# Patient Record
Sex: MALE | Race: BLACK OR AFRICAN AMERICAN | NOT HISPANIC OR LATINO | URBAN - METROPOLITAN AREA
[De-identification: names, ages, dates, MRNs, and addresses within clinical notes are randomized per-mention and may not be internally consistent; named-entity substitution may affect disease eponyms.]

---

## 2018-07-26 ENCOUNTER — OUTPATIENT (OUTPATIENT)
Dept: OUTPATIENT SERVICES | Facility: HOSPITAL | Age: 68
LOS: 1 days | End: 2018-07-26

## 2018-07-26 VITALS
DIASTOLIC BLOOD PRESSURE: 78 MMHG | WEIGHT: 207.9 LBS | TEMPERATURE: 98 F | HEART RATE: 64 BPM | HEIGHT: 69 IN | RESPIRATION RATE: 16 BRPM | SYSTOLIC BLOOD PRESSURE: 128 MMHG

## 2018-07-26 DIAGNOSIS — Z01.818 ENCOUNTER FOR OTHER PREPROCEDURAL EXAMINATION: ICD-10-CM

## 2018-07-26 DIAGNOSIS — Z95.0 PRESENCE OF CARDIAC PACEMAKER: Chronic | ICD-10-CM

## 2018-07-26 DIAGNOSIS — Z98.89 OTHER SPECIFIED POSTPROCEDURAL STATES: Chronic | ICD-10-CM

## 2018-07-26 DIAGNOSIS — C90.00 MULTIPLE MYELOMA NOT HAVING ACHIEVED REMISSION: ICD-10-CM

## 2018-07-26 DIAGNOSIS — C90.00 MULTIPLE MYELOMA NOT HAVING ACHIEVED REMISSION: Chronic | ICD-10-CM

## 2018-07-26 DIAGNOSIS — Z95.0 PRESENCE OF CARDIAC PACEMAKER: ICD-10-CM

## 2018-07-26 DIAGNOSIS — T82.591A OTHER MECHANICAL COMPLICATION OF SURGICALLY CREATED ARTERIOVENOUS SHUNT, INITIAL ENCOUNTER: ICD-10-CM

## 2018-07-26 DIAGNOSIS — G47.33 OBSTRUCTIVE SLEEP APNEA (ADULT) (PEDIATRIC): ICD-10-CM

## 2018-07-26 LAB
APTT BLD: 37.8 SEC — HIGH (ref 27.5–37.4)
BUN SERPL-MCNC: 14 MG/DL — SIGNIFICANT CHANGE UP (ref 7–23)
CALCIUM SERPL-MCNC: 10 MG/DL — SIGNIFICANT CHANGE UP (ref 8.4–10.5)
CHLORIDE SERPL-SCNC: 103 MMOL/L — SIGNIFICANT CHANGE UP (ref 98–107)
CO2 SERPL-SCNC: 25 MMOL/L — SIGNIFICANT CHANGE UP (ref 22–31)
CREAT SERPL-MCNC: 0.87 MG/DL — SIGNIFICANT CHANGE UP (ref 0.5–1.3)
GLUCOSE SERPL-MCNC: 84 MG/DL — SIGNIFICANT CHANGE UP (ref 70–99)
HCT VFR BLD CALC: 41.5 % — SIGNIFICANT CHANGE UP (ref 39–50)
HGB BLD-MCNC: 13.7 G/DL — SIGNIFICANT CHANGE UP (ref 13–17)
INR BLD: 1.02 — SIGNIFICANT CHANGE UP (ref 0.88–1.17)
MCHC RBC-ENTMCNC: 31.7 PG — SIGNIFICANT CHANGE UP (ref 27–34)
MCHC RBC-ENTMCNC: 33 % — SIGNIFICANT CHANGE UP (ref 32–36)
MCV RBC AUTO: 96.1 FL — SIGNIFICANT CHANGE UP (ref 80–100)
NRBC # FLD: 0 — SIGNIFICANT CHANGE UP
PLATELET # BLD AUTO: 213 K/UL — SIGNIFICANT CHANGE UP (ref 150–400)
PMV BLD: 10.8 FL — SIGNIFICANT CHANGE UP (ref 7–13)
POTASSIUM SERPL-MCNC: 4.2 MMOL/L — SIGNIFICANT CHANGE UP (ref 3.5–5.3)
POTASSIUM SERPL-SCNC: 4.2 MMOL/L — SIGNIFICANT CHANGE UP (ref 3.5–5.3)
PROTHROM AB SERPL-ACNC: 11.7 SEC — SIGNIFICANT CHANGE UP (ref 9.8–13.1)
RBC # BLD: 4.32 M/UL — SIGNIFICANT CHANGE UP (ref 4.2–5.8)
RBC # FLD: 14.3 % — SIGNIFICANT CHANGE UP (ref 10.3–14.5)
SODIUM SERPL-SCNC: 141 MMOL/L — SIGNIFICANT CHANGE UP (ref 135–145)
WBC # BLD: 6.16 K/UL — SIGNIFICANT CHANGE UP (ref 3.8–10.5)
WBC # FLD AUTO: 6.16 K/UL — SIGNIFICANT CHANGE UP (ref 3.8–10.5)

## 2018-07-26 NOTE — H&P PST ADULT - LYMPHATIC
posterior cervical L/anterior cervical R/supraclavicular L/anterior cervical L/posterior cervical R/supraclavicular R

## 2018-07-26 NOTE — H&P PST ADULT - PROBLEM SELECTOR PLAN 1
to have removal of infusa port on 8/2/18. Scheduled to have removal of infusa port on 8/2/18.   labs pending, EKG to be done by Cardiologist .  Preop instructions provided to pt.   Chlorhexidine scrub and famotidine provided to pt with instructions

## 2018-07-26 NOTE — H&P PST ADULT - PROBLEM SELECTOR PLAN 3
Pt had pacemaker in place right CW.   Pt advised to obtain cardiac clearance prior to surgery.   Pt was on coumadin, and d/charan by oncologist on 7/24/18. Pt/ptt done. As per pt - coumadin was to prevent clots in his chemo port

## 2018-07-26 NOTE — H&P PST ADULT - NEGATIVE GENERAL GENITOURINARY SYMPTOMS
no flank pain R/no incontinence/no flank pain L/no renal colic/no hematuria/normal urinary frequency/no bladder infections/no dysuria

## 2018-07-26 NOTE — H&P PST ADULT - ASSESSMENT
67 yrs old male with h/o MM and had chemo via chemo port that was inserted in 2007 presents for preop eval to have removal of infusa port on 8/2/18.

## 2018-07-26 NOTE — H&P PST ADULT - NEGATIVE ENMT SYMPTOMS
no tinnitus/no throat pain/no dysphagia/no vertigo/no hearing difficulty/no sinus symptoms/no ear pain

## 2018-07-26 NOTE — H&P PST ADULT - NEGATIVE CARDIOVASCULAR SYMPTOMS
no orthopnea/no paroxysmal nocturnal dyspnea/no dyspnea on exertion/no chest pain/no peripheral edema/no claudication/h/o bradycardia with insertion of pacemaker in 2011/no palpitations

## 2018-07-26 NOTE — H&P PST ADULT - PMH
CAD (coronary artery disease)    GERD (gastroesophageal reflux disease)    Hemorrhoid    HTN (hypertension)    Hypothyroidism    Multiple myeloma  dx in 1990's  Obstructive sleep apnea  does not use CPAP machine  Pacemaker  2011  Port catheter in place  on Coumadin  PUD (peptic ulcer disease)

## 2018-08-01 ENCOUNTER — TRANSCRIPTION ENCOUNTER (OUTPATIENT)
Age: 68
End: 2018-08-01

## 2018-08-02 ENCOUNTER — OUTPATIENT (OUTPATIENT)
Dept: OUTPATIENT SERVICES | Facility: HOSPITAL | Age: 68
LOS: 1 days | Discharge: HOME | End: 2018-08-02
Payer: MEDICARE

## 2018-08-02 VITALS
RESPIRATION RATE: 16 BRPM | HEIGHT: 69 IN | TEMPERATURE: 98 F | WEIGHT: 207.9 LBS | OXYGEN SATURATION: 97 % | HEART RATE: 50 BPM | SYSTOLIC BLOOD PRESSURE: 134 MMHG | DIASTOLIC BLOOD PRESSURE: 84 MMHG

## 2018-08-02 VITALS
OXYGEN SATURATION: 98 % | DIASTOLIC BLOOD PRESSURE: 73 MMHG | TEMPERATURE: 98 F | SYSTOLIC BLOOD PRESSURE: 127 MMHG | HEART RATE: 50 BPM

## 2018-08-02 DIAGNOSIS — Z98.89 OTHER SPECIFIED POSTPROCEDURAL STATES: Chronic | ICD-10-CM

## 2018-08-02 DIAGNOSIS — C90.00 MULTIPLE MYELOMA NOT HAVING ACHIEVED REMISSION: Chronic | ICD-10-CM

## 2018-08-02 DIAGNOSIS — Z95.0 PRESENCE OF CARDIAC PACEMAKER: Chronic | ICD-10-CM

## 2018-08-02 DIAGNOSIS — T82.591A OTHER MECHANICAL COMPLICATION OF SURGICALLY CREATED ARTERIOVENOUS SHUNT, INITIAL ENCOUNTER: ICD-10-CM

## 2018-08-02 LAB
INR BLD: 1.03 — SIGNIFICANT CHANGE UP (ref 0.88–1.17)
PROTHROM AB SERPL-ACNC: 11.9 SEC — SIGNIFICANT CHANGE UP (ref 9.8–13.1)

## 2018-08-02 NOTE — ASU DISCHARGE PLAN (ADULT/PEDIATRIC). - MEDICATION SUMMARY - MEDICATIONS TO TAKE
I will START or STAY ON the medications listed below when I get home from the hospital:    Dalila Aspirin Regimen 81 mg oral delayed release tablet  -- 1 tab(s) by mouth once a day  -- Indication: For home med     mg oral tablet  -- 1 tab(s) by mouth every 8 hours - for mild pain - for moderate pain with food  -- Do not take this drug if you are pregnant.  It is very important that you take or use this exactly as directed.  Do not skip doses or discontinue unless directed by your doctor.  May cause drowsiness or dizziness.  Obtain medical advice before taking any non-prescription drugs as some may affect the action of this medication.  Take with food or milk.      -- Indication: For home med    acetaminophen-oxycodone 325 mg-5 mg oral tablet  -- 1 tab(s) by mouth every 6 hours MDD:3  -- Caution federal law prohibits the transfer of this drug to any person other  than the person for whom it was prescribed.  May cause drowsiness.  Alcohol may intensify this effect.  Use care when operating dangerous machinery.  This prescription cannot be refilled.  This product contains acetaminophen.  Do not use  with any other product containing acetaminophen to prevent possible liver damage.  Using more of this medication than prescribed may cause serious breathing problems.      -- Indication: For home med    warfarin 1 mg oral tablet  -- 1 tab(s) by mouth once a day  -- Indication: For home med    carvedilol 25 mg oral tablet  -- 1 tab(s) by mouth 2 times a day  -- Indication: For home med    Zometa  --  intravenous every 6 weeks  -- Indication: For home med    NIFEdipine 90 mg oral tablet, extended release  -- 1 tab(s) by mouth once a day  -- Indication: For home med    thalidomide 50 mg oral capsule  -- 1 cap(s) by mouth once a day (at bedtime)  -- Indication: For home med    Prevacid 30 mg oral delayed release capsule  -- 1 cap(s) by mouth once a day  -- Indication: For home med    levothyroxine 25 mcg (0.025 mg) oral capsule  -- 1 cap(s) by mouth once a day  -- Indication: For home med

## 2018-08-02 NOTE — ASU DISCHARGE PLAN (ADULT/PEDIATRIC). - SPECIAL INSTRUCTIONS
WOUND: Please keep incisions clean and dry. Please do not scrub or rub incisions. Please to do not apply lotion or powder on incisions. For  pain take Motrin  over the counter.   BATH: Please do not submerge wound under water. You may shower or use sponge bath.  ACTIVITY: No heavy lifting or straining until your follow up appointment. Otherwise, you may return to your usual level of physical activity. If you are taking narcotic pain medication (such as Percocet) DO NOT drive a car, operate machinery or make important decisions.  DIET: Return to your usual diet.  NOTIFY YOUR SURGEON IF: You have any bleeding that does not stop, any pus draining from your wound(s), any fever (over 100.4 F) or chills, persistent nausea/vomiting, persistent diarrhea, or if your pain is not controlled on your discharge pain medications.  FOLLOW-UP: Please follow-up with your surgeon, within 1-2 weeks following discharge- please call to schedule an appointment.

## 2018-08-03 ENCOUNTER — RESULT REVIEW (OUTPATIENT)
Age: 68
End: 2018-08-03

## 2018-08-03 PROCEDURE — 88300 SURGICAL PATH GROSS: CPT | Mod: 26

## 2018-11-15 LAB — SURGICAL PATHOLOGY STUDY: SIGNIFICANT CHANGE UP

## 2019-12-26 NOTE — H&P PST ADULT - VASCULAR
Rinvoq 15 mg -  Approved - Ready to fill       Authorization Number: [id number]   Valid from 12/26/2019 to 12/31/2020     Please send script to [id number] - [place], [place] - [address] AT [address]   Phone: [phone], Fax: [phone]     Pharmacy Coverage [employer] (medicare)   Patient's Co-Pay: $50     Co pay Assistance Information   Patient not eligible for assistance.     Additional Information:   Spoke to patient they are aware of approval and filling pharmacy.       Tim Turner   12/26/19    detailed exam

## 2021-04-22 ENCOUNTER — APPOINTMENT (OUTPATIENT)
Dept: ULTRASOUND IMAGING | Facility: IMAGING CENTER | Age: 71
End: 2021-04-22
Payer: MEDICARE

## 2021-04-22 ENCOUNTER — APPOINTMENT (OUTPATIENT)
Dept: RADIOLOGY | Facility: IMAGING CENTER | Age: 71
End: 2021-04-22
Payer: MEDICARE

## 2021-04-22 ENCOUNTER — OUTPATIENT (OUTPATIENT)
Dept: OUTPATIENT SERVICES | Facility: HOSPITAL | Age: 71
LOS: 1 days | End: 2021-04-22
Payer: MEDICARE

## 2021-04-22 DIAGNOSIS — C90.00 MULTIPLE MYELOMA NOT HAVING ACHIEVED REMISSION: Chronic | ICD-10-CM

## 2021-04-22 DIAGNOSIS — Z00.8 ENCOUNTER FOR OTHER GENERAL EXAMINATION: ICD-10-CM

## 2021-04-22 DIAGNOSIS — Z98.89 OTHER SPECIFIED POSTPROCEDURAL STATES: Chronic | ICD-10-CM

## 2021-04-22 DIAGNOSIS — Z95.0 PRESENCE OF CARDIAC PACEMAKER: Chronic | ICD-10-CM

## 2021-04-22 PROCEDURE — 72100 X-RAY EXAM L-S SPINE 2/3 VWS: CPT | Mod: 26

## 2021-04-22 PROCEDURE — 76700 US EXAM ABDOM COMPLETE: CPT | Mod: 26

## 2021-04-22 PROCEDURE — 76700 US EXAM ABDOM COMPLETE: CPT

## 2021-04-22 PROCEDURE — 72100 X-RAY EXAM L-S SPINE 2/3 VWS: CPT

## 2021-09-22 ENCOUNTER — APPOINTMENT (OUTPATIENT)
Dept: ELECTROPHYSIOLOGY | Facility: CLINIC | Age: 71
End: 2021-09-22
Payer: MEDICARE

## 2021-09-22 ENCOUNTER — NON-APPOINTMENT (OUTPATIENT)
Age: 71
End: 2021-09-22

## 2021-09-22 VITALS — BODY MASS INDEX: 30.26 KG/M2 | OXYGEN SATURATION: 99 % | HEIGHT: 71.5 IN

## 2021-09-22 VITALS — SYSTOLIC BLOOD PRESSURE: 131 MMHG | DIASTOLIC BLOOD PRESSURE: 81 MMHG | HEART RATE: 50 BPM

## 2021-09-22 VITALS — BODY MASS INDEX: 30.26 KG/M2 | WEIGHT: 220 LBS

## 2021-09-22 PROCEDURE — 93280 PM DEVICE PROGR EVAL DUAL: CPT

## 2021-09-22 PROCEDURE — 93000 ELECTROCARDIOGRAM COMPLETE: CPT | Mod: 59

## 2021-09-22 PROCEDURE — 99204 OFFICE O/P NEW MOD 45 MIN: CPT

## 2021-09-22 NOTE — HISTORY OF PRESENT ILLNESS
[FreeTextEntry1] : Referring Physician: Brennan Hanson MD\par  \par Dear Dr. Hanson:\par  \par Mr. Bhatt was seen in the [facility] today. For our records, please allow me to summarize the history and my findings.\par  \par This pleasant 70 year old man has a cardiovascular history significant for sinus node dysfunction. His medical history is significant for HTN and multiple myeloma. A Holter monitor performed on 6/3/21 showed a HR of <50% more than 70% of the time, with 23 pauses exceeding 2 seconds. His Coreg was subsequently stopped, but a repeat Holter on 6/21/11 off Coreg showed 10 pauses. Given his fatigue, bradycardia, and pauses, he underwent placement of a dual chamber PPM by Dr Gallegos. He was noted in 2011 to have RV lead noise, with frequent mode switches to DOO due to noise revision. Given that he was not V pacer dependant, Dr Gallegos turned off noise revision. The patient returns today for follow up in our clinic, as he changed cardiologist and needs his device managed now. His interrogation today shows multiple episodes of RV lead noise which are unchanged. He has both an atrial and ventricular underlying. He is A-V pacing today, though overall his V pacing burden in <9%. he has not has any dizziness or syncope episodes. \par \par  \par Mr. Bhatt denies any recent history of chest pain, shortness of breath, palpitations, dizziness, or syncope.\par

## 2021-09-22 NOTE — PHYSICAL EXAM
[Well Developed] : well developed [Well Nourished] : well nourished [No Acute Distress] : no acute distress [Normal Conjunctiva] : normal conjunctiva [Normal Venous Pressure] : normal venous pressure [No Carotid Bruit] : no carotid bruit [Normal S1, S2] : normal S1, S2 [No Murmur] : no murmur [No Rub] : no rub [No Gallop] : no gallop [Clear Lung Fields] : clear lung fields [No Respiratory Distress] : no respiratory distress  [Good Air Entry] : good air entry [Soft] : abdomen soft [Non Tender] : non-tender [No Masses/organomegaly] : no masses/organomegaly [Normal Bowel Sounds] : normal bowel sounds [Normal Gait] : normal gait [No Edema] : no edema [No Cyanosis] : no cyanosis [No Clubbing] : no clubbing [No Rash] : no rash [No Skin Lesions] : no skin lesions [Moves all extremities] : moves all extremities [No Focal Deficits] : no focal deficits [Normal Speech] : normal speech [Alert and Oriented] : alert and oriented [Normal memory] : normal memory [de-identified] : Right chest infra-clavicular wound well healed, no erythema or TTP or swelling.

## 2021-09-22 NOTE — DISCUSSION/SUMMARY
[FreeTextEntry1] : In summary, this is a 70 year old man with sinus node dysfunction s/p dual chamber PPM in 2011. He is overall doing well. Chronic RV lead noise noted since 2011. Discussed with the patient that he will likely need an extraction with re-implantation at the time of his generator change in 2 years. He is occasionally V pacing, though he does have a stable underlying. We turned on VIP today to help with preventing V pacing. His AV delays are extended. He was instructed to report immediate episodes of dizziness and seek ED care for syncope. He will return in 6 months.\par  \par Mr. Bhatt appeared to understand the whole discussion and verbalized that all of his questions were answered to his satisfaction.\par  \par Thank you for allowing me to be involved in the care of this pleasant man. Please feel free to contact me with any questions.\par

## 2022-02-24 NOTE — ASU DISCHARGE PLAN (ADULT/PEDIATRIC). - INSTRUCTIONS
2nd attempt. Lvm for pt to give us a call back to schedule her annual and to refill her prescription.   No fried, spicy or greasy foods. Increase fluids as tolerated  If your doctor prescribed narcotic pain medications after your procedure to help prevent and reduce constipation, increase fluid intake and fiber intake in your diet. You may take OTC Stool Softeners such as Colace to help reduce constipation.

## 2022-03-21 ENCOUNTER — APPOINTMENT (OUTPATIENT)
Dept: ELECTROPHYSIOLOGY | Facility: CLINIC | Age: 72
End: 2022-03-21
Payer: MEDICARE

## 2022-03-21 DIAGNOSIS — R00.1 BRADYCARDIA, UNSPECIFIED: ICD-10-CM

## 2022-03-21 PROCEDURE — 93280 PM DEVICE PROGR EVAL DUAL: CPT

## 2022-06-22 ENCOUNTER — APPOINTMENT (OUTPATIENT)
Dept: ELECTROPHYSIOLOGY | Facility: CLINIC | Age: 72
End: 2022-06-22

## 2022-06-22 ENCOUNTER — NON-APPOINTMENT (OUTPATIENT)
Age: 72
End: 2022-06-22

## 2022-06-22 PROCEDURE — 93294 REM INTERROG EVL PM/LDLS PM: CPT

## 2022-06-22 PROCEDURE — 93296 REM INTERROG EVL PM/IDS: CPT

## 2022-11-30 ENCOUNTER — APPOINTMENT (OUTPATIENT)
Dept: ELECTROPHYSIOLOGY | Facility: CLINIC | Age: 72
End: 2022-11-30
Payer: MEDICARE

## 2022-11-30 VITALS — HEIGHT: 71 IN | WEIGHT: 220 LBS | BODY MASS INDEX: 30.8 KG/M2 | OXYGEN SATURATION: 97 %

## 2022-11-30 VITALS — DIASTOLIC BLOOD PRESSURE: 77 MMHG | HEART RATE: 51 BPM | SYSTOLIC BLOOD PRESSURE: 137 MMHG

## 2022-11-30 PROCEDURE — 99214 OFFICE O/P EST MOD 30 MIN: CPT

## 2022-11-30 PROCEDURE — 93000 ELECTROCARDIOGRAM COMPLETE: CPT | Mod: 59

## 2022-11-30 PROCEDURE — 93280 PM DEVICE PROGR EVAL DUAL: CPT

## 2022-11-30 RX ORDER — SULFAMETHOXAZOLE AND TRIMETHOPRIM 800; 160 MG/1; MG/1
800-160 TABLET ORAL
Qty: 6 | Refills: 0 | Status: DISCONTINUED | COMMUNITY
Start: 2022-08-10

## 2022-11-30 RX ORDER — ASPIRIN 81 MG/1
81 TABLET ORAL DAILY
Refills: 0 | Status: ACTIVE | COMMUNITY

## 2022-11-30 RX ORDER — ALFUZOSIN HYDROCHLORIDE 10 MG/1
10 TABLET, EXTENDED RELEASE ORAL
Qty: 90 | Refills: 0 | Status: DISCONTINUED | COMMUNITY
Start: 2022-09-20

## 2022-11-30 RX ORDER — ROSUVASTATIN CALCIUM 20 MG/1
20 TABLET, FILM COATED ORAL DAILY
Refills: 0 | Status: ACTIVE | COMMUNITY

## 2022-11-30 RX ORDER — NIFEDIPINE 90 MG/1
90 TABLET, EXTENDED RELEASE ORAL
Qty: 90 | Refills: 0 | Status: DISCONTINUED | COMMUNITY
Start: 2022-09-21

## 2022-11-30 RX ORDER — CARVEDILOL 25 MG/1
25 TABLET, FILM COATED ORAL TWICE DAILY
Refills: 0 | Status: ACTIVE | COMMUNITY

## 2022-11-30 RX ORDER — THALIDOMIDE 50 MG/1
50 CAPSULE ORAL
Refills: 0 | Status: ACTIVE | COMMUNITY

## 2022-11-30 RX ORDER — FLUTICASONE PROPIONATE 50 UG/1
50 SPRAY, METERED NASAL
Refills: 0 | Status: ACTIVE | COMMUNITY

## 2022-11-30 RX ORDER — LANSOPRAZOLE 30 MG/1
30 CAPSULE, DELAYED RELEASE ORAL
Refills: 0 | Status: DISCONTINUED | COMMUNITY
End: 2022-11-30

## 2022-11-30 RX ORDER — LEVOTHYROXINE SODIUM 0.03 MG/1
25 TABLET ORAL DAILY
Refills: 0 | Status: ACTIVE | COMMUNITY

## 2022-11-30 RX ORDER — CEFDINIR 300 MG/1
300 CAPSULE ORAL
Qty: 6 | Refills: 0 | Status: DISCONTINUED | COMMUNITY
Start: 2022-08-10

## 2022-11-30 RX ORDER — FINASTERIDE 5 MG/1
5 TABLET, FILM COATED ORAL
Qty: 90 | Refills: 0 | Status: DISCONTINUED | COMMUNITY
Start: 2022-07-14

## 2022-11-30 RX ORDER — LATANOPROST/PF 0.005 %
0.01 DROPS OPHTHALMIC (EYE)
Refills: 0 | Status: ACTIVE | COMMUNITY

## 2022-12-01 ENCOUNTER — NON-APPOINTMENT (OUTPATIENT)
Age: 72
End: 2022-12-01

## 2022-12-01 NOTE — PHYSICAL EXAM
[Well Developed] : well developed [Well Nourished] : well nourished [No Acute Distress] : no acute distress [Normal Conjunctiva] : normal conjunctiva [Normal Venous Pressure] : normal venous pressure [No Carotid Bruit] : no carotid bruit [Normal S1, S2] : normal S1, S2 [No Murmur] : no murmur [No Rub] : no rub [No Gallop] : no gallop [Clear Lung Fields] : clear lung fields [Good Air Entry] : good air entry [No Respiratory Distress] : no respiratory distress  [Soft] : abdomen soft [Non Tender] : non-tender [No Masses/organomegaly] : no masses/organomegaly [Normal Bowel Sounds] : normal bowel sounds [Normal Gait] : normal gait [No Edema] : no edema [No Cyanosis] : no cyanosis [No Clubbing] : no clubbing [No Rash] : no rash [No Skin Lesions] : no skin lesions [Moves all extremities] : moves all extremities [No Focal Deficits] : no focal deficits [Normal Speech] : normal speech [Alert and Oriented] : alert and oriented [Normal memory] : normal memory [No Varicosities] : no varicosities [de-identified] : Right chest infra-clavicular wound well healed, no erythema or TTP or swelling.

## 2022-12-01 NOTE — HISTORY OF PRESENT ILLNESS
[FreeTextEntry1] : Referring Physician: Brennan Hanson MD\par  \par Dear Dr. Hanson:\par  \par Mr. Bhatt was seen in the [facility]rophysiology Clinic today. For our records, please allow me to summarize the history and my findings.\par  \par This pleasant 72 year old man has a cardiovascular history significant for sinus node dysfunction. His medical history is significant for HTN and multiple myeloma. A Holter monitor performed on 6/3/21 showed a HR of <50% more than 70% of the time, with 23 pauses exceeding 2 seconds. His Coreg was subsequently stopped, but a repeat Holter on 6/21/11 off Coreg showed 10 pauses. Given his fatigue, bradycardia, and pauses, he underwent placement of a dual chamber PPM by Dr Gallegos. He was noted in 2011 to have RV lead noise, with frequent mode switches to DOO due to noise revision. Given that he was not V pacer dependant, Dr Gallegos turned off noise revision. The patient returns for follow up in our clinic previously, as he changed cardiologist and needs his device managed now. His interrogation today shows multiple episodes of RV lead noise which are unchanged. He has both an atrial and ventricular underlying. He is A-V pacing today, though overall his V pacing burden has been increasing. He has not has any dizziness or syncope episodes. His device is <3 months to MOE. \par  \par Mr. Bhatt denies any recent history of chest pain, shortness of breath, palpitations, dizziness, or syncope.

## 2022-12-01 NOTE — PHYSICAL EXAM
[Well Developed] : well developed [Well Nourished] : well nourished [No Acute Distress] : no acute distress [Normal Conjunctiva] : normal conjunctiva [Normal Venous Pressure] : normal venous pressure [No Carotid Bruit] : no carotid bruit [Normal S1, S2] : normal S1, S2 [No Murmur] : no murmur [No Rub] : no rub [No Gallop] : no gallop [Clear Lung Fields] : clear lung fields [Good Air Entry] : good air entry [No Respiratory Distress] : no respiratory distress  [Soft] : abdomen soft [Non Tender] : non-tender [No Masses/organomegaly] : no masses/organomegaly [Normal Bowel Sounds] : normal bowel sounds [Normal Gait] : normal gait [No Edema] : no edema [No Cyanosis] : no cyanosis [No Clubbing] : no clubbing [No Rash] : no rash [No Skin Lesions] : no skin lesions [Moves all extremities] : moves all extremities [No Focal Deficits] : no focal deficits [Normal Speech] : normal speech [Alert and Oriented] : alert and oriented [Normal memory] : normal memory [No Varicosities] : no varicosities [de-identified] : Right chest infra-clavicular wound well healed, no erythema or TTP or swelling.

## 2022-12-01 NOTE — HISTORY OF PRESENT ILLNESS
[FreeTextEntry1] : Referring Physician: Brennan Hanson MD\par  \par Dear Dr. Hanson:\par  \par Mr. Bhatt was seen in the [facility] today. For our records, please allow me to summarize the history and my findings.\par  \par This pleasant 72 year old man has a cardiovascular history significant for sinus node dysfunction. His medical history is significant for HTN and multiple myeloma. A Holter monitor performed on 6/3/21 showed a HR of <50% more than 70% of the time, with 23 pauses exceeding 2 seconds. His Coreg was subsequently stopped, but a repeat Holter on 6/21/11 off Coreg showed 10 pauses. Given his fatigue, bradycardia, and pauses, he underwent placement of a dual chamber PPM by Dr Gallegos. He was noted in 2011 to have RV lead noise, with frequent mode switches to DOO due to noise revision. Given that he was not V pacer dependant, Dr Gallegos turned off noise revision. The patient returns for follow up in our clinic previously, as he changed cardiologist and needs his device managed now. His interrogation today shows multiple episodes of RV lead noise which are unchanged. He has both an atrial and ventricular underlying. He is A-V pacing today, though overall his V pacing burden has been increasing. He has not has any dizziness or syncope episodes. His device is <3 months to MOE. \par  \par Mr. Bhatt denies any recent history of chest pain, shortness of breath, palpitations, dizziness, or syncope.

## 2022-12-01 NOTE — CARDIOLOGY SUMMARY
[de-identified] : 9/22/21 - A-V sequential pacing at 50 bpm\par 11/30/22 - A paced V sensed at 50 bpm

## 2022-12-01 NOTE — DISCUSSION/SUMMARY
[EKG obtained to assist in diagnosis and management of assessed problem(s)] : EKG obtained to assist in diagnosis and management of assessed problem(s) [FreeTextEntry1] : In summary, this is a 72 year old man with sinus node dysfunction s/p dual chamber PPM in 2011. He is overall doing well. Chronic RV lead noise noted since 2011. Discussed with the patient that he will likely need an extraction with re-implantation at the time of his generator change. He is not at MOE yet. We will continue monthly remote checks.\par  \par Mr. Bhatt appeared to understand the whole discussion and verbalized that all of his questions were answered to his satisfaction.\par  \par Thank you for allowing me to be involved in the care of this pleasant man. Please feel free to contact me with any questions.\par

## 2022-12-01 NOTE — CARDIOLOGY SUMMARY
[de-identified] : 9/22/21 - A-V sequential pacing at 50 bpm\par 11/30/22 - A paced V sensed at 50 bpm

## 2022-12-22 ENCOUNTER — OUTPATIENT (OUTPATIENT)
Dept: OUTPATIENT SERVICES | Facility: HOSPITAL | Age: 72
LOS: 1 days | End: 2022-12-22

## 2022-12-22 VITALS
DIASTOLIC BLOOD PRESSURE: 85 MMHG | WEIGHT: 214.07 LBS | SYSTOLIC BLOOD PRESSURE: 150 MMHG | TEMPERATURE: 98 F | HEIGHT: 70 IN | HEART RATE: 88 BPM | RESPIRATION RATE: 16 BRPM | OXYGEN SATURATION: 98 %

## 2022-12-22 VITALS
TEMPERATURE: 98 F | RESPIRATION RATE: 16 BRPM | HEART RATE: 88 BPM | OXYGEN SATURATION: 98 % | SYSTOLIC BLOOD PRESSURE: 150 MMHG | HEIGHT: 70 IN | WEIGHT: 214.07 LBS | DIASTOLIC BLOOD PRESSURE: 85 MMHG

## 2022-12-22 DIAGNOSIS — Z98.89 OTHER SPECIFIED POSTPROCEDURAL STATES: Chronic | ICD-10-CM

## 2022-12-22 DIAGNOSIS — I49.5 SICK SINUS SYNDROME: ICD-10-CM

## 2022-12-22 DIAGNOSIS — K21.9 GASTRO-ESOPHAGEAL REFLUX DISEASE WITHOUT ESOPHAGITIS: ICD-10-CM

## 2022-12-22 DIAGNOSIS — I25.10 ATHEROSCLEROTIC HEART DISEASE OF NATIVE CORONARY ARTERY WITHOUT ANGINA PECTORIS: ICD-10-CM

## 2022-12-22 DIAGNOSIS — C90.00 MULTIPLE MYELOMA NOT HAVING ACHIEVED REMISSION: Chronic | ICD-10-CM

## 2022-12-22 DIAGNOSIS — Z85.79 PERSONAL HISTORY OF OTHER MALIGNANT NEOPLASMS OF LYMPHOID, HEMATOPOIETIC AND RELATED TISSUES: ICD-10-CM

## 2022-12-22 DIAGNOSIS — G47.33 OBSTRUCTIVE SLEEP APNEA (ADULT) (PEDIATRIC): ICD-10-CM

## 2022-12-22 DIAGNOSIS — T78.40XS ALLERGY, UNSPECIFIED, SEQUELA: ICD-10-CM

## 2022-12-22 DIAGNOSIS — E03.9 HYPOTHYROIDISM, UNSPECIFIED: ICD-10-CM

## 2022-12-22 DIAGNOSIS — I10 ESSENTIAL (PRIMARY) HYPERTENSION: ICD-10-CM

## 2022-12-22 DIAGNOSIS — Z86.69 PERSONAL HISTORY OF OTHER DISEASES OF THE NERVOUS SYSTEM AND SENSE ORGANS: ICD-10-CM

## 2022-12-22 DIAGNOSIS — Z95.0 PRESENCE OF CARDIAC PACEMAKER: Chronic | ICD-10-CM

## 2022-12-22 LAB
ALBUMIN SERPL ELPH-MCNC: 4 G/DL — SIGNIFICANT CHANGE UP (ref 3.3–5)
ALP SERPL-CCNC: 53 U/L — SIGNIFICANT CHANGE UP (ref 40–120)
ALT FLD-CCNC: 20 U/L — SIGNIFICANT CHANGE UP (ref 4–41)
ANION GAP SERPL CALC-SCNC: 10 MMOL/L — SIGNIFICANT CHANGE UP (ref 7–14)
AST SERPL-CCNC: 19 U/L — SIGNIFICANT CHANGE UP (ref 4–40)
BILIRUB DIRECT SERPL-MCNC: <0.2 MG/DL — SIGNIFICANT CHANGE UP (ref 0–0.3)
BILIRUB INDIRECT FLD-MCNC: >0.1 MG/DL — SIGNIFICANT CHANGE UP (ref 0–1)
BILIRUB SERPL-MCNC: 0.3 MG/DL — SIGNIFICANT CHANGE UP (ref 0.2–1.2)
BLD GP AB SCN SERPL QL: NEGATIVE — SIGNIFICANT CHANGE UP
BUN SERPL-MCNC: 14 MG/DL — SIGNIFICANT CHANGE UP (ref 7–23)
CALCIUM SERPL-MCNC: 9.1 MG/DL — SIGNIFICANT CHANGE UP (ref 8.4–10.5)
CHLORIDE SERPL-SCNC: 105 MMOL/L — SIGNIFICANT CHANGE UP (ref 98–107)
CO2 SERPL-SCNC: 28 MMOL/L — SIGNIFICANT CHANGE UP (ref 22–31)
CREAT SERPL-MCNC: 0.77 MG/DL — SIGNIFICANT CHANGE UP (ref 0.5–1.3)
EGFR: 94 ML/MIN/1.73M2 — SIGNIFICANT CHANGE UP
GLUCOSE SERPL-MCNC: 69 MG/DL — LOW (ref 70–99)
HCT VFR BLD CALC: 38.9 % — LOW (ref 39–50)
HGB BLD-MCNC: 12.7 G/DL — LOW (ref 13–17)
MCHC RBC-ENTMCNC: 32.3 PG — SIGNIFICANT CHANGE UP (ref 27–34)
MCHC RBC-ENTMCNC: 32.6 GM/DL — SIGNIFICANT CHANGE UP (ref 32–36)
MCV RBC AUTO: 99 FL — SIGNIFICANT CHANGE UP (ref 80–100)
NRBC # BLD: 0 /100 WBCS — SIGNIFICANT CHANGE UP (ref 0–0)
NRBC # FLD: 0 K/UL — SIGNIFICANT CHANGE UP (ref 0–0)
PLATELET # BLD AUTO: 204 K/UL — SIGNIFICANT CHANGE UP (ref 150–400)
POTASSIUM SERPL-MCNC: 3.6 MMOL/L — SIGNIFICANT CHANGE UP (ref 3.5–5.3)
POTASSIUM SERPL-SCNC: 3.6 MMOL/L — SIGNIFICANT CHANGE UP (ref 3.5–5.3)
PROT SERPL-MCNC: 6.9 G/DL — SIGNIFICANT CHANGE UP (ref 6–8.3)
RBC # BLD: 3.93 M/UL — LOW (ref 4.2–5.8)
RBC # FLD: 14.5 % — SIGNIFICANT CHANGE UP (ref 10.3–14.5)
RH IG SCN BLD-IMP: POSITIVE — SIGNIFICANT CHANGE UP
SODIUM SERPL-SCNC: 143 MMOL/L — SIGNIFICANT CHANGE UP (ref 135–145)
WBC # BLD: 4.77 K/UL — SIGNIFICANT CHANGE UP (ref 3.8–10.5)
WBC # FLD AUTO: 4.77 K/UL — SIGNIFICANT CHANGE UP (ref 3.8–10.5)

## 2022-12-22 NOTE — H&P PST ADULT - NSICDXFAMILYHX_GEN_ALL_CORE_FT
Patient here for 2 week blood pressure check.  BP was elevated at 4/5/21 office visit & patient was started on Amlodipine 2.5mg daily.  She takes this every morning & has not missed doses.  She denies side effects.  States she is feeling well. Denies CP, dizziness, SOB, fatigue.  She has not recently checked her BP at home.    Initial blood pressure done by writer= 178/100 with pulse= 58 (radial, regular) on left arm.  Recheck on left arm after patient sitting quietly for 10 minutes= 160/90.  Blood pressure on patient's right arm= 160/92.    Updated DR. Sahni.  Patient is to increase Amlodipine to 5mg daily (she will take x2 of the 2.5mg tablets until gone).  New script for 5mg tablets faxed to [facility].  Patient aware she will only take 1 daily of the 5mg tablets.  Patient scheduled for MD visit 5/4/21 at 10:00am.  She will call if any issues prior to appointment.  
FAMILY HISTORY:  No pertinent family history in first degree relatives

## 2022-12-22 NOTE — H&P PST ADULT - PROBLEM SELECTOR PLAN 1
Scheduled for SJM generator change   Preop instructions provided and patient verbalizes understanding.  Labs done and results pending. Hibiclens provided with instructions and was signed by patient. Teach-back method was utilized to assess patient's understanding. Patient verbalized understanding.  Covid requirements discussed with pt .

## 2022-12-22 NOTE — H&P PST ADULT - HISTORY OF PRESENT ILLNESS
This is a 72 y.o. male with history of sinus node dysfunction. Pt has RACW pacemaker - dual chamber ,[employer] Model [id number] placed Aug 1,2011. Pt had halter monitor done in pst. BENJA LAST EVALUATED 11/30/22. Pt is scheduled for surgery 1/6/23 ,pt offers no complaints in pst.

## 2022-12-22 NOTE — H&P PST ADULT - PROBLEM SELECTOR PLAN 4
/85 in pst . Pt offers no complaints . Pt will follow cardiac medication as per Dr Correa for day of procedure.

## 2022-12-22 NOTE — H&P PST ADULT - NSICDXPASTMEDICALHX_GEN_ALL_CORE_FT
PAST MEDICAL HISTORY:  CAD (coronary artery disease)     GERD (gastroesophageal reflux disease)     Hemorrhoid     History of glaucoma     HTN (hypertension)     Hypothyroidism     Multiple myeloma dx in 1990's    Obstructive sleep apnea does not use CPAP machine    Pacemaker 2011    PUD (peptic ulcer disease)     Urinary tract infection

## 2022-12-22 NOTE — H&P PST ADULT - PROBLEM SELECTOR PLAN 6
/85 in pst , offers no complaints . Pt will follow cardiac medication as per Dr Correa for day of procedure.

## 2022-12-22 NOTE — H&P PST ADULT - PROBLEM SELECTOR PLAN 2
last EKG on chart 11/22 from cardiology . Dr Vela is pt cardiologist . P{t takes aspirin daily , denies any cardiac stents .

## 2022-12-22 NOTE — H&P PST ADULT - HEIGHT IN CM
Procedure(s):  ESOPHAGOGASTRODUODENOSCOPY (EGD)  ESOPHAGOGASTRODUODENAL (EGD) BIOPSY. MAC    Anesthesia Post Evaluation        Patient location during evaluation: PACU  Note status: Adequate. Level of consciousness: responsive to verbal stimuli and sleepy but conscious  Pain management: satisfactory to patient  Airway patency: patent  Anesthetic complications: no  Cardiovascular status: acceptable  Respiratory status: acceptable  Hydration status: acceptable  Comments: +Post-Anesthesia Evaluation and Assessment    Patient: Jay Perez MRN: [id number]  SSN: [id number]   YOB: 1958  Age: 75 y.o. Sex: male          Cardiovascular Function/Vital Signs    BP (!) 129/96   Pulse 76   Temp (!) 35.7 °C (96.2 °F)   Resp 17   Ht 5' 7\" (1.702 m)   Wt 103 kg (227 lb)   SpO2 94%   BMI 35.55 kg/m²     Patient is status post Procedure(s):  ESOPHAGOGASTRODUODENOSCOPY (EGD)  ESOPHAGOGASTRODUODENAL (EGD) BIOPSY. Nausea/Vomiting: Controlled. Postoperative hydration reviewed and adequate. Pain:  Pain Scale 1: Numeric (0 - 10) (11/04/22 1100)  Pain Intensity 1: 0 (11/04/22 1100)   Managed. Neurological Status: At baseline. Mental Status and Level of Consciousness: Arousable. Pulmonary Status:   O2 Device: None (Room air) (11/04/22 1100)   Adequate oxygenation and airway patent. Complications related to anesthesia: None    Post-anesthesia assessment completed. No concerns. I have evaluated the patient and the patient is stable and ready to be discharged from PACU . Signed By: Yanelis Barraza MD    11/4/2022        INITIAL Post-op Vital signs:   Vitals Value Taken Time   /97 11/04/22 1105   Temp 35.7 °C (96.2 °F) 11/04/22 1050   Pulse 72 11/04/22 1105   Resp 16 11/04/22 1105   SpO2 90 % 11/04/22 1105   Vitals shown include unvalidated device data.
177.8

## 2022-12-22 NOTE — H&P PST ADULT - HISTORY OF PRESENT ILLNESS
This is a 72 y.o. male with history of sinus node dysfunction  . Pt has RACW pacemaker - dual chamber ,  [employer] , model [id number] placed Aug.1 ,2011. Pt had last halter monitor 6/21/21. Pacemaker last evaluated by Dr Correa 11/30/22. Pt is scheduled for generator change 1/6/23. Pt offers n complaints in pst .

## 2022-12-22 NOTE — H&P PST ADULT - NSICDXPASTSURGICALHX_GEN_ALL_CORE_FT
PAST SURGICAL HISTORY:  Cardiac pacemaker 8/1/2011    H/O excision of mass right middle finger    Multiple myeloma h/o stem cell transplant ?2006; insertion of left anterior mediport insertion. chemo (last in 2006)

## 2022-12-22 NOTE — H&P PST ADULT - PROBLEM SELECTOR PLAN 1
Scheduled for SJM PPM generator change   Preop instructions provided and patient verbalizes understanding.  Labs done and results pending. Hibiclens provided with instructions and was signed by patient. Teach-back method was utilized to assess patient's understanding. Patient verbalized understanding.  Covid requirements discussed with pt .

## 2023-01-06 ENCOUNTER — INPATIENT (INPATIENT)
Facility: HOSPITAL | Age: 73
LOS: 2 days | Discharge: ANOTHER HOSPITAL | End: 2023-01-09
Attending: INTERNAL MEDICINE | Admitting: INTERNAL MEDICINE
Payer: MEDICARE

## 2023-01-06 DIAGNOSIS — T78.40XS ALLERGY, UNSPECIFIED, SEQUELA: ICD-10-CM

## 2023-01-06 DIAGNOSIS — Z85.79 PERSONAL HISTORY OF OTHER MALIGNANT NEOPLASMS OF LYMPHOID, HEMATOPOIETIC AND RELATED TISSUES: ICD-10-CM

## 2023-01-06 DIAGNOSIS — I10 ESSENTIAL (PRIMARY) HYPERTENSION: ICD-10-CM

## 2023-01-06 DIAGNOSIS — I49.5 SICK SINUS SYNDROME: ICD-10-CM

## 2023-01-06 DIAGNOSIS — C90.00 MULTIPLE MYELOMA NOT HAVING ACHIEVED REMISSION: Chronic | ICD-10-CM

## 2023-01-06 DIAGNOSIS — I25.10 ATHEROSCLEROTIC HEART DISEASE OF NATIVE CORONARY ARTERY WITHOUT ANGINA PECTORIS: ICD-10-CM

## 2023-01-06 DIAGNOSIS — H40.9 UNSPECIFIED GLAUCOMA: ICD-10-CM

## 2023-01-06 DIAGNOSIS — K21.9 GASTRO-ESOPHAGEAL REFLUX DISEASE WITHOUT ESOPHAGITIS: ICD-10-CM

## 2023-01-06 DIAGNOSIS — E03.9 HYPOTHYROIDISM, UNSPECIFIED: ICD-10-CM

## 2023-01-06 DIAGNOSIS — Z86.79 PERSONAL HISTORY OF OTHER DISEASES OF THE CIRCULATORY SYSTEM: ICD-10-CM

## 2023-01-06 DIAGNOSIS — G47.33 OBSTRUCTIVE SLEEP APNEA (ADULT) (PEDIATRIC): ICD-10-CM

## 2023-01-06 DIAGNOSIS — Z95.0 PRESENCE OF CARDIAC PACEMAKER: Chronic | ICD-10-CM

## 2023-01-06 DIAGNOSIS — Z98.89 OTHER SPECIFIED POSTPROCEDURAL STATES: Chronic | ICD-10-CM

## 2023-01-06 PROCEDURE — 93653 COMPRE EP EVAL TX SVT: CPT

## 2023-01-06 PROCEDURE — 93010 ELECTROCARDIOGRAM REPORT: CPT

## 2023-01-06 PROCEDURE — 33228 REMV&REPLC PM GEN DUAL LEAD: CPT | Mod: 74

## 2023-01-06 RX ORDER — SODIUM CHLORIDE 9 MG/ML
3 INJECTION INTRAMUSCULAR; INTRAVENOUS; SUBCUTANEOUS EVERY 8 HOURS
Refills: 0 | Status: DISCONTINUED | OUTPATIENT
Start: 2023-01-06 | End: 2023-01-09

## 2023-01-06 RX ORDER — NIFEDIPINE 30 MG
90 TABLET, EXTENDED RELEASE 24 HR ORAL DAILY
Refills: 0 | Status: DISCONTINUED | OUTPATIENT
Start: 2023-01-06 | End: 2023-01-09

## 2023-01-06 RX ORDER — CARVEDILOL PHOSPHATE 80 MG/1
1 CAPSULE, EXTENDED RELEASE ORAL
Qty: 0 | Refills: 0 | DISCHARGE

## 2023-01-06 RX ORDER — ASPIRIN/CALCIUM CARB/MAGNESIUM 324 MG
1 TABLET ORAL
Qty: 0 | Refills: 0 | DISCHARGE

## 2023-01-06 RX ORDER — L.ACIDOPH/B.ANIMALIS/B.LONGUM 15B CELL
1 CAPSULE ORAL
Qty: 0 | Refills: 0 | DISCHARGE

## 2023-01-06 RX ORDER — ERGOCALCIFEROL 1.25 MG/1
1 CAPSULE ORAL
Qty: 0 | Refills: 0 | DISCHARGE

## 2023-01-06 RX ORDER — CARVEDILOL PHOSPHATE 80 MG/1
25 CAPSULE, EXTENDED RELEASE ORAL EVERY 12 HOURS
Refills: 0 | Status: DISCONTINUED | OUTPATIENT
Start: 2023-01-06 | End: 2023-01-09

## 2023-01-06 RX ORDER — LATANOPROST 0.05 MG/ML
1 SOLUTION/ DROPS OPHTHALMIC; TOPICAL
Qty: 0 | Refills: 0 | DISCHARGE

## 2023-01-06 RX ORDER — LATANOPROST 0.05 MG/ML
1 SOLUTION/ DROPS OPHTHALMIC; TOPICAL AT BEDTIME
Refills: 0 | Status: DISCONTINUED | OUTPATIENT
Start: 2023-01-06 | End: 2023-01-09

## 2023-01-06 RX ORDER — PREGABALIN 225 MG/1
1 CAPSULE ORAL
Qty: 0 | Refills: 0 | DISCHARGE

## 2023-01-06 RX ORDER — FERROUS SULFATE 325(65) MG
1 TABLET ORAL
Qty: 0 | Refills: 0 | DISCHARGE

## 2023-01-06 RX ORDER — CEFAZOLIN SODIUM 1 G
1000 VIAL (EA) INJECTION EVERY 8 HOURS
Refills: 0 | Status: COMPLETED | OUTPATIENT
Start: 2023-01-06 | End: 2023-01-08

## 2023-01-06 RX ORDER — FLUTICASONE PROPIONATE 50 MCG
1 SPRAY, SUSPENSION NASAL
Qty: 0 | Refills: 0 | DISCHARGE

## 2023-01-06 RX ORDER — LEVOTHYROXINE SODIUM 125 MCG
1 TABLET ORAL
Qty: 0 | Refills: 0 | DISCHARGE

## 2023-01-06 RX ORDER — LEVOTHYROXINE SODIUM 125 MCG
25 TABLET ORAL DAILY
Refills: 0 | Status: DISCONTINUED | OUTPATIENT
Start: 2023-01-06 | End: 2023-01-09

## 2023-01-06 RX ORDER — NIFEDIPINE 30 MG
1 TABLET, EXTENDED RELEASE 24 HR ORAL
Qty: 0 | Refills: 0 | DISCHARGE

## 2023-01-06 RX ORDER — ASPIRIN/CALCIUM CARB/MAGNESIUM 324 MG
81 TABLET ORAL DAILY
Refills: 0 | Status: DISCONTINUED | OUTPATIENT
Start: 2023-01-07 | End: 2023-01-09

## 2023-01-06 RX ORDER — LANSOPRAZOLE 15 MG/1
1 CAPSULE, DELAYED RELEASE ORAL
Qty: 0 | Refills: 0 | DISCHARGE

## 2023-01-06 RX ORDER — THALIDOMIDE 200 MG/1
1 CAPSULE ORAL
Qty: 0 | Refills: 0 | DISCHARGE

## 2023-01-06 RX ORDER — FLUTICASONE PROPIONATE 50 MCG
1 SPRAY, SUSPENSION NASAL
Refills: 0 | Status: DISCONTINUED | OUTPATIENT
Start: 2023-01-06 | End: 2023-01-09

## 2023-01-06 RX ADMIN — SODIUM CHLORIDE 3 MILLILITER(S): 9 INJECTION INTRAMUSCULAR; INTRAVENOUS; SUBCUTANEOUS at 21:57

## 2023-01-06 RX ADMIN — Medication 100 MILLIGRAM(S): at 21:35

## 2023-01-06 RX ADMIN — CARVEDILOL PHOSPHATE 25 MILLIGRAM(S): 80 CAPSULE, EXTENDED RELEASE ORAL at 17:36

## 2023-01-06 RX ADMIN — LATANOPROST 1 DROP(S): 0.05 SOLUTION/ DROPS OPHTHALMIC; TOPICAL at 21:57

## 2023-01-06 NOTE — PATIENT PROFILE ADULT - FALL HARM RISK - FACTORS NURSING JUDGEMENT
Cardiology Cardiology Heme/Onc Pulmonology Heme/Onc Heme/Onc Heme/Onc Cardiology Cardiology Cardiology Hospitalist Internal Medicine Internal Medicine Hospitalist Hospitalist Internal Medicine No

## 2023-01-07 LAB
ALBUMIN SERPL ELPH-MCNC: 3.7 G/DL — SIGNIFICANT CHANGE UP (ref 3.3–5)
ALP SERPL-CCNC: 50 U/L — SIGNIFICANT CHANGE UP (ref 40–120)
ALT FLD-CCNC: 13 U/L — SIGNIFICANT CHANGE UP (ref 4–41)
ANION GAP SERPL CALC-SCNC: 8 MMOL/L — SIGNIFICANT CHANGE UP (ref 7–14)
AST SERPL-CCNC: 13 U/L — SIGNIFICANT CHANGE UP (ref 4–40)
BASOPHILS # BLD AUTO: 0.05 K/UL — SIGNIFICANT CHANGE UP (ref 0–0.2)
BASOPHILS NFR BLD AUTO: 1 % — SIGNIFICANT CHANGE UP (ref 0–2)
BILIRUB SERPL-MCNC: 0.3 MG/DL — SIGNIFICANT CHANGE UP (ref 0.2–1.2)
BUN SERPL-MCNC: 12 MG/DL — SIGNIFICANT CHANGE UP (ref 7–23)
CALCIUM SERPL-MCNC: 8.6 MG/DL — SIGNIFICANT CHANGE UP (ref 8.4–10.5)
CHLORIDE SERPL-SCNC: 104 MMOL/L — SIGNIFICANT CHANGE UP (ref 98–107)
CO2 SERPL-SCNC: 28 MMOL/L — SIGNIFICANT CHANGE UP (ref 22–31)
CREAT SERPL-MCNC: 0.83 MG/DL — SIGNIFICANT CHANGE UP (ref 0.5–1.3)
EGFR: 92 ML/MIN/1.73M2 — SIGNIFICANT CHANGE UP
EOSINOPHIL # BLD AUTO: 0.11 K/UL — SIGNIFICANT CHANGE UP (ref 0–0.5)
EOSINOPHIL NFR BLD AUTO: 2.1 % — SIGNIFICANT CHANGE UP (ref 0–6)
GLUCOSE SERPL-MCNC: 79 MG/DL — SIGNIFICANT CHANGE UP (ref 70–99)
HCT VFR BLD CALC: 39.2 % — SIGNIFICANT CHANGE UP (ref 39–50)
HGB BLD-MCNC: 13.1 G/DL — SIGNIFICANT CHANGE UP (ref 13–17)
IANC: 3.58 K/UL — SIGNIFICANT CHANGE UP (ref 1.8–7.4)
IMM GRANULOCYTES NFR BLD AUTO: 0.2 % — SIGNIFICANT CHANGE UP (ref 0–0.9)
LYMPHOCYTES # BLD AUTO: 0.97 K/UL — LOW (ref 1–3.3)
LYMPHOCYTES # BLD AUTO: 18.5 % — SIGNIFICANT CHANGE UP (ref 13–44)
MAGNESIUM SERPL-MCNC: 2 MG/DL — SIGNIFICANT CHANGE UP (ref 1.6–2.6)
MCHC RBC-ENTMCNC: 32.1 PG — SIGNIFICANT CHANGE UP (ref 27–34)
MCHC RBC-ENTMCNC: 33.4 GM/DL — SIGNIFICANT CHANGE UP (ref 32–36)
MCV RBC AUTO: 96.1 FL — SIGNIFICANT CHANGE UP (ref 80–100)
MONOCYTES # BLD AUTO: 0.51 K/UL — SIGNIFICANT CHANGE UP (ref 0–0.9)
MONOCYTES NFR BLD AUTO: 9.8 % — SIGNIFICANT CHANGE UP (ref 2–14)
NEUTROPHILS # BLD AUTO: 3.58 K/UL — SIGNIFICANT CHANGE UP (ref 1.8–7.4)
NEUTROPHILS NFR BLD AUTO: 68.4 % — SIGNIFICANT CHANGE UP (ref 43–77)
NRBC # BLD: 0 /100 WBCS — SIGNIFICANT CHANGE UP (ref 0–0)
NRBC # FLD: 0 K/UL — SIGNIFICANT CHANGE UP (ref 0–0)
PHOSPHATE SERPL-MCNC: 2.7 MG/DL — SIGNIFICANT CHANGE UP (ref 2.5–4.5)
PLATELET # BLD AUTO: 184 K/UL — SIGNIFICANT CHANGE UP (ref 150–400)
POTASSIUM SERPL-MCNC: 3.6 MMOL/L — SIGNIFICANT CHANGE UP (ref 3.5–5.3)
POTASSIUM SERPL-SCNC: 3.6 MMOL/L — SIGNIFICANT CHANGE UP (ref 3.5–5.3)
PROT SERPL-MCNC: 6.5 G/DL — SIGNIFICANT CHANGE UP (ref 6–8.3)
RBC # BLD: 4.08 M/UL — LOW (ref 4.2–5.8)
RBC # FLD: 14.1 % — SIGNIFICANT CHANGE UP (ref 10.3–14.5)
SODIUM SERPL-SCNC: 140 MMOL/L — SIGNIFICANT CHANGE UP (ref 135–145)
WBC # BLD: 5.23 K/UL — SIGNIFICANT CHANGE UP (ref 3.8–10.5)
WBC # FLD AUTO: 5.23 K/UL — SIGNIFICANT CHANGE UP (ref 3.8–10.5)

## 2023-01-07 RX ADMIN — Medication 100 MILLIGRAM(S): at 04:43

## 2023-01-07 RX ADMIN — Medication 25 MICROGRAM(S): at 04:41

## 2023-01-07 RX ADMIN — SODIUM CHLORIDE 3 MILLILITER(S): 9 INJECTION INTRAMUSCULAR; INTRAVENOUS; SUBCUTANEOUS at 14:09

## 2023-01-07 RX ADMIN — SODIUM CHLORIDE 3 MILLILITER(S): 9 INJECTION INTRAMUSCULAR; INTRAVENOUS; SUBCUTANEOUS at 05:00

## 2023-01-07 RX ADMIN — Medication 100 MILLIGRAM(S): at 21:57

## 2023-01-07 RX ADMIN — LATANOPROST 1 DROP(S): 0.05 SOLUTION/ DROPS OPHTHALMIC; TOPICAL at 23:10

## 2023-01-07 RX ADMIN — Medication 90 MILLIGRAM(S): at 04:41

## 2023-01-07 RX ADMIN — Medication 100 MILLIGRAM(S): at 12:06

## 2023-01-07 RX ADMIN — CARVEDILOL PHOSPHATE 25 MILLIGRAM(S): 80 CAPSULE, EXTENDED RELEASE ORAL at 15:45

## 2023-01-07 RX ADMIN — Medication 81 MILLIGRAM(S): at 15:45

## 2023-01-07 RX ADMIN — SODIUM CHLORIDE 3 MILLILITER(S): 9 INJECTION INTRAMUSCULAR; INTRAVENOUS; SUBCUTANEOUS at 21:54

## 2023-01-07 RX ADMIN — CARVEDILOL PHOSPHATE 25 MILLIGRAM(S): 80 CAPSULE, EXTENDED RELEASE ORAL at 04:40

## 2023-01-08 LAB — SARS-COV-2 RNA SPEC QL NAA+PROBE: SIGNIFICANT CHANGE UP

## 2023-01-08 RX ORDER — ACETAMINOPHEN 500 MG
975 TABLET ORAL ONCE
Refills: 0 | Status: COMPLETED | OUTPATIENT
Start: 2023-01-08 | End: 2023-01-08

## 2023-01-08 RX ADMIN — Medication 81 MILLIGRAM(S): at 17:59

## 2023-01-08 RX ADMIN — Medication 25 MICROGRAM(S): at 05:02

## 2023-01-08 RX ADMIN — SODIUM CHLORIDE 3 MILLILITER(S): 9 INJECTION INTRAMUSCULAR; INTRAVENOUS; SUBCUTANEOUS at 13:53

## 2023-01-08 RX ADMIN — LATANOPROST 1 DROP(S): 0.05 SOLUTION/ DROPS OPHTHALMIC; TOPICAL at 22:46

## 2023-01-08 RX ADMIN — CARVEDILOL PHOSPHATE 25 MILLIGRAM(S): 80 CAPSULE, EXTENDED RELEASE ORAL at 05:02

## 2023-01-08 RX ADMIN — SODIUM CHLORIDE 3 MILLILITER(S): 9 INJECTION INTRAMUSCULAR; INTRAVENOUS; SUBCUTANEOUS at 05:06

## 2023-01-08 RX ADMIN — SODIUM CHLORIDE 3 MILLILITER(S): 9 INJECTION INTRAMUSCULAR; INTRAVENOUS; SUBCUTANEOUS at 22:46

## 2023-01-08 RX ADMIN — Medication 100 MILLIGRAM(S): at 05:03

## 2023-01-08 RX ADMIN — Medication 100 MILLIGRAM(S): at 12:31

## 2023-01-08 RX ADMIN — CARVEDILOL PHOSPHATE 25 MILLIGRAM(S): 80 CAPSULE, EXTENDED RELEASE ORAL at 17:58

## 2023-01-08 RX ADMIN — Medication 975 MILLIGRAM(S): at 05:07

## 2023-01-08 RX ADMIN — Medication 1 SPRAY(S): at 05:08

## 2023-01-08 RX ADMIN — Medication 975 MILLIGRAM(S): at 05:02

## 2023-01-08 RX ADMIN — Medication 90 MILLIGRAM(S): at 05:02

## 2023-01-08 RX ADMIN — Medication 1 SPRAY(S): at 18:00

## 2023-01-08 NOTE — PROGRESS NOTE ADULT - NUTRITIONAL ASSESSMENT
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 During procedure RV lead visually appeared to have the insulation fall apart, explaining all the noise seen.  Given that, the decision was made to close the pocket, a Tyrex pouch was left, and plan to transfer to [facility] for lead extraction on Monday with re-implantation.

## 2023-01-08 NOTE — CHART NOTE - NSCHARTNOTEFT_GEN_A_CORE
Interventional Recovery Suite Pre-Procedure PA Note    In brief, this is a 73 y/o male with a PMHx of sinus node dysfunction s/p right anterior chest wall [employer] PPM placement, hypertension, hypothyroidism, glaucoma and multiple myeloma s/p stem cell transplant on chemotherapy, who presents for pacemaker generator change as device at [facility] (elective replacement interval). H&P from PST reviewed. Medication reconciliation edited/updated where appropriate. Patient is not on systemic AC. Last PO intake was 1/5/2023 at 21:00. No history of JARRED, dentures, or loose teeth. EKG reviewed. COVID PCR negative from 1/3/2023. Procedure explained in detail. Risks/benefits discussed. All questions answered. Consent obtained.
Patient presented today for generator change as his device is at [facility]. We discussed prior, same as we did in clinic, with regards to his generator change and noise on the RV lead. We discussed generator change only vs lead extraction with gen change. He is only dependent in the A. He paces about 20% in the V. He has had noise since implant, and the 2088 lead is known to have noise. After discussion, we decided to proceed with generator change. However, when I opened his pocket in the lab, the RV lead visually appeared to have the insulation fall apart, explaining all the noise seen. Given that, the decision was made to close the pocket, a Tyrex pouch was left, and plan to transfer to [facility] for lead extraction on Monday with re-implantation.
Patient needs COVID swab today for transfer and procedure tomorrow.

## 2023-01-08 NOTE — PROGRESS NOTE ADULT - TIME BILLING
- Review of records, telemetry, vital signs and daily labs.   - General and cardiovascular physical examination.  - Generation of cardiovascular treatment plan.  - Coordination of care.      Patient was seen and examined by me on 1/8/23interim events noted,labs and radiology studies reviewed.  Flash Rivas MD,FACC.  [address] [place], [place] [id number].  [phone]

## 2023-01-08 NOTE — PROGRESS NOTE ADULT - SUBJECTIVE AND OBJECTIVE BOX
SUBJECTIVE / OVERNIGHT EVENTS:pt seen and examined   01-08-23       MEDICATIONS  (STANDING):  aspirin enteric coated 81 milliGRAM(s) Oral daily  carvedilol 25 milliGRAM(s) Oral every 12 hours  fluticasone propionate 50 MICROgram(s)/spray Nasal Spray 1 Spray(s) Both Nostrils two times a day  latanoprost 0.005% Ophthalmic Solution 1 Drop(s) Both EYES at bedtime  levothyroxine 25 MICROGram(s) Oral daily  NIFEdipine XL 90 milliGRAM(s) Oral daily  sodium chloride 0.9% lock flush 3 milliLiter(s) IV Push every 8 hours    MEDICATIONS  (PRN):    Vital Signs Last 24 Hrs  T(C): 36.6 (01-08-23 @ 22:01), Max: 36.8 (01-08-23 @ 04:57)  T(F): 97.8 (01-08-23 @ 22:01), Max: 98.2 (01-08-23 @ 04:57)  HR: 51 (01-08-23 @ 22:01) (50 - 51)  BP: 136/85 (01-08-23 @ 22:01) (126/78 - 153/95)  BP(mean): --  RR: 17 (01-08-23 @ 22:01) (17 - 17)  SpO2: 98% (01-08-23 @ 22:01) (98% - 99%)        Constitutional: No fever, fatigue  Skin: No rash.  Eyes: No recent vision problems or eye pain.  ENT: No congestion, ear pain, or sore throat.  Cardiovascular: No chest pain or palpation.  Respiratory: No cough, shortness of breath, congestion, or wheezing.  Gastrointestinal: No abdominal pain, nausea, vomiting, or diarrhea.  Genitourinary: No dysuria.  Musculoskeletal: No joint swelling.  Neurologic: No headache.    PHYSICAL EXAM:  GENERAL: NAD  EYES: EOMI, PERRLA  NECK: Supple, No JVD  CHEST/LUNG: dec breath sounds at bases  HEART:  S1 , S2 +  ABDOMEN: soft , bs+  EXTREMITIES:  trace edema  NEUROLOGY:alert awake    LABS:  01-07    140  |  104  |  12  ----------------------------<  79  3.6   |  28  |  0.83    Ca    8.6      07 Jan 2023 07:30  Phos  2.7     01-07  Mg     2.00     01-07    TPro  6.5  /  Alb  3.7  /  TBili  0.3  /  DBili      /  AST  13  /  ALT  13  /  AlkPhos  50  01-07    Creatinine Trend: 0.83 <--                        13.1   5.23  )-----------( 184      ( 07 Jan 2023 07:30 )             39.2     Urine Studies:            LIVER FUNCTIONS - ( 07 Jan 2023 07:30 )  Alb: 3.7 g/dL / Pro: 6.5 g/dL / ALK PHOS: 50 U/L / ALT: 13 U/L / AST: 13 U/L / GGT: x                   RADIOLOGY & ADDITIONAL TESTS:    Imaging Personally Reviewed:    Consultant(s) Notes Reviewed:      Care Discussed with Consultants/Other Providers:  
    SUBJECTIVE / OVERNIGHT EVENTS:pt seen and examined   01-07-23     MEDICATIONS  (STANDING):  aspirin enteric coated 81 milliGRAM(s) Oral daily  carvedilol 25 milliGRAM(s) Oral every 12 hours  ceFAZolin   IVPB 1000 milliGRAM(s) IV Intermittent every 8 hours  fluticasone propionate 50 MICROgram(s)/spray Nasal Spray 1 Spray(s) Both Nostrils two times a day  latanoprost 0.005% Ophthalmic Solution 1 Drop(s) Both EYES at bedtime  levothyroxine 25 MICROGram(s) Oral daily  NIFEdipine XL 90 milliGRAM(s) Oral daily  sodium chloride 0.9% lock flush 3 milliLiter(s) IV Push every 8 hours    MEDICATIONS  (PRN):    T(C): 36.9 (01-07-23 @ 22:36), Max: 36.9 (01-07-23 @ 15:40)  HR: 53 (01-07-23 @ 22:36) (51 - 62)  BP: 136/86 (01-07-23 @ 22:36) (125/100 - 141/72)  RR: 17 (01-07-23 @ 22:36) (17 - 18)  SpO2: 99% (01-07-23 @ 22:36) (98% - 100%)    CAPILLARY BLOOD GLUCOSE        I&O's Summary      Constitutional: No fever, fatigue  Skin: No rash.  Eyes: No recent vision problems or eye pain.  ENT: No congestion, ear pain, or sore throat.  Cardiovascular: No chest pain or palpation.  Respiratory: No cough, shortness of breath, congestion, or wheezing.  Gastrointestinal: No abdominal pain, nausea, vomiting, or diarrhea.  Genitourinary: No dysuria.  Musculoskeletal: No joint swelling.  Neurologic: No headache.    PHYSICAL EXAM:  GENERAL: NAD  EYES: EOMI, PERRLA  NECK: Supple, No JVD  CHEST/LUNG: dec breath sounds at bases  HEART:  S1 , S2 +  ABDOMEN: soft , bs+  EXTREMITIES:  trace edema  NEUROLOGY:alert awake      LABS:                        13.1   5.23  )-----------( 184      ( 07 Jan 2023 07:30 )             39.2     01-07    140  |  104  |  12  ----------------------------<  79  3.6   |  28  |  0.83    Ca    8.6      07 Jan 2023 07:30  Phos  2.7     01-07  Mg     2.00     01-07    TPro  6.5  /  Alb  3.7  /  TBili  0.3  /  DBili  x   /  AST  13  /  ALT  13  /  AlkPhos  50  01-07              RADIOLOGY & ADDITIONAL TESTS:    Imaging Personally Reviewed:    Consultant(s) Notes Reviewed:      Care Discussed with Consultants/Other Providers:  
Interval History: Denies cardiac complaints     Medications:  aspirin enteric coated 81 milliGRAM(s) Oral daily  carvedilol 25 milliGRAM(s) Oral every 12 hours  ceFAZolin   IVPB 1000 milliGRAM(s) IV Intermittent every 8 hours  fluticasone propionate 50 MICROgram(s)/spray Nasal Spray 1 Spray(s) Both Nostrils two times a day  latanoprost 0.005% Ophthalmic Solution 1 Drop(s) Both EYES at bedtime  levothyroxine 25 MICROGram(s) Oral daily  NIFEdipine XL 90 milliGRAM(s) Oral daily  sodium chloride 0.9% lock flush 3 milliLiter(s) IV Push every 8 hours      Vitals:  T(C): 36.7 (01-07-23 @ 04:43), Max: 36.7 (01-07-23 @ 04:43)  HR: 51 (01-07-23 @ 04:43) (51 - 54)  BP: 125/100 (01-07-23 @ 04:43) (125/100 - 142/86)  RR: 18 (01-07-23 @ 04:43) (18 - 18)  SpO2: 100% (01-07-23 @ 04:43) (100% - 100%)       Physical Exam:  Appearance: No Acute Distress  HEENT: No JVD  Cardiovascular: Normal S1 S2, No murmurs/rubs/gallops  Respiratory: Clear to auscultation bilaterally  Gastrointestinal: Soft, Non-tender	  Skin: No cyanosis	  Neurologic: Non-focal  Extremities: No LE edema  Psychiatry: A & O x 3, Mood & affect appropriate    Labs:                        13.1   5.23  )-----------( 184      ( 07 Jan 2023 07:30 )             39.2     01-07    140  |  104  |  12  ----------------------------<  79  3.6   |  28  |  0.83    Ca    8.6      07 Jan 2023 07:30  Phos  2.7     01-07  Mg     2.00     01-07    TPro  6.5  /  Alb  3.7  /  TBili  0.3  /  DBili  x   /  AST  13  /  ALT  13  /  AlkPhos  50  01-07         TELEMETRY: AV paced      
Interval History: Feels well overnight     Medications:  aspirin enteric coated 81 milliGRAM(s) Oral daily  carvedilol 25 milliGRAM(s) Oral every 12 hours  ceFAZolin   IVPB 1000 milliGRAM(s) IV Intermittent every 8 hours  fluticasone propionate 50 MICROgram(s)/spray Nasal Spray 1 Spray(s) Both Nostrils two times a day  latanoprost 0.005% Ophthalmic Solution 1 Drop(s) Both EYES at bedtime  levothyroxine 25 MICROGram(s) Oral daily  NIFEdipine XL 90 milliGRAM(s) Oral daily  sodium chloride 0.9% lock flush 3 milliLiter(s) IV Push every 8 hours      Vitals:  T(C): 36.8 (01-08-23 @ 04:57), Max: 36.9 (01-07-23 @ 15:40)  HR: 50 (01-08-23 @ 04:57) (50 - 62)  BP: 135/76 (01-08-23 @ 04:57) (130/77 - 141/72)  RR: 17 (01-08-23 @ 04:57) (17 - 18)  SpO2: 99% (01-08-23 @ 04:57) (98% - 99%)    Physical Exam:  Appearance: No Acute Distress  HEENT: No JVD  Cardiovascular: Normal S1 S2, No murmurs/rubs/gallops  Respiratory: Clear to auscultation bilaterally  Extremities: No LE edema  Psychiatry: A & O x 3, Mood & affect appropriate    Labs:                        13.1   5.23  )-----------( 184      ( 07 Jan 2023 07:30 )             39.2     01-07    140  |  104  |  12  ----------------------------<  79  3.6   |  28  |  0.83    Ca    8.6      07 Jan 2023 07:30  Phos  2.7     01-07  Mg     2.00     01-07    TPro  6.5  /  Alb  3.7  /  TBili  0.3  /  DBili  x   /  AST  13  /  ALT  13  /  AlkPhos  50  01-07               TELEMETRY: A paced 50       
PATIENT SEEN AND EXAMINED ON :- 1/8/23  DATE OF SERVICE:  1/8/23           Interim events noted,Labs ,Radiological studies and Cardiology tests reviewed .       HOSPITAL COURSE: HPI:  This is a 72 y.o. male with history of sinus node dysfunction  . Pt has RACW pacemaker - dual chamber ,  [employer] , model [id number] placed Aug.1 ,2011. Pt had last halter monitor 6/21/21. Pacemaker last evaluated by Dr Correa 11/30/22. Pt is scheduled for generator change 1/6/23. Pt offers n complaints in pst .  (22 Dec 2022 08:45)      INTERIM EVENTS:Patient seen at bedside ,interim events noted.      PMH -reviewed admission note, no change since admission  HEART FAILURE: Acute[ ]Chronic[ ] Systolic[ ] Diastolic[ ] Combined Systolic and Diastolic[ ]  CAD[ ] CABG[ ] PCI[ ]  DEVICES[ ] PPM[ ] ICD[ ] ILR[ ]  ATRIAL FIBRILLATION[ ] Paroxysmal[ ] Permanent[ ] CHADS2-[  ]  CHARLI[ ] CKD1[ ] CKD2[ ] CKD3[ ] CKD4[ ] ESRD[ ]  COPD[ ] HTN[ ]   DM[ ] Type1[ ] Type 2[ ]   CVA[ ] Paresis[ ]    AMBULATION: Assisted[ ] Cane/walker[ ] Independent[ ]    MEDICATIONS  (STANDING):  aspirin enteric coated 81 milliGRAM(s) Oral daily  carvedilol 25 milliGRAM(s) Oral every 12 hours  fluticasone propionate 50 MICROgram(s)/spray Nasal Spray 1 Spray(s) Both Nostrils two times a day  latanoprost 0.005% Ophthalmic Solution 1 Drop(s) Both EYES at bedtime  levothyroxine 25 MICROGram(s) Oral daily  NIFEdipine XL 90 milliGRAM(s) Oral daily  sodium chloride 0.9% lock flush 3 milliLiter(s) IV Push every 8 hours    MEDICATIONS  (PRN):            REVIEW OF SYSTEMS:  Constitutional: [ ] fever, [ ]weight loss,  [ ]fatigue [ ]weight gain  Eyes: [ ] visual changes  Respiratory: [ ]shortness of breath;  [ ] cough, [ ]wheezing, [ ]chills, [ ]hemoptysis  Cardiovascular: [ ] chest pain, [ ]palpitations, [ ]dizziness,  [ ]leg swelling[ ]orthopnea[ ]PND  Gastrointestinal: [ ] abdominal pain, [ ]nausea, [ ]vomiting,  [ ]diarrhea [ ]Constipation [ ]Melena  Genitourinary: [ ] dysuria, [ ] hematuria [ ]Carbajal  Neurologic: [ ] headaches [ ] tremors[ ]weakness [ ]Paralysis Right[ ] Left[ ]  Skin: [ ] itching, [ ]burning, [ ] rashes  Endocrine: [ ] heat or cold intolerance  Musculoskeletal: [ ] joint pain or swelling; [ ] muscle, back, or extremity pain  Psychiatric: [ ] depression, [ ]anxiety, [ ]mood swings, or [ ]difficulty sleeping  Hematologic: [ ] easy bruising, [ ] bleeding gums    [ ] All remaining systems negative except as per above.   [ ]Unable to obtain.  [x] No change in ROS since admission      Vital Signs Last 24 Hrs  T(C): 36.7 (08 Jan 2023 11:59), Max: 36.9 (07 Jan 2023 22:36)  T(F): 98.1 (08 Jan 2023 11:59), Max: 98.4 (07 Jan 2023 22:36)  HR: 50 (08 Jan 2023 16:53) (50 - 53)  BP: 153/95 (08 Jan 2023 16:53) (126/78 - 153/95)  BP(mean): --  RR: 17 (08 Jan 2023 11:59) (17 - 17)  SpO2: 99% (08 Jan 2023 11:59) (99% - 99%)    Parameters below as of 08 Jan 2023 11:59  Patient On (Oxygen Delivery Method): room air      I&O's Summary      PHYSICAL EXAM:  General: No acute distress BMI-  HEENT: EOMI, PERRL  Neck: Supple, [ ] JVD  Lungs: Equal air entry bilaterally; [ ] rales [ ] wheezing [ ] rhonchi  Heart: Regular rate and rhythm; [x ] murmur   2/6 [ x] systolic [ ] diastolic [ ] radiation[ ] rubs [ ]  gallops  Abdomen: Nontender, bowel sounds present  Extremities: No clubbing, cyanosis, [ ] edema [ ]Pulses  equal and intact  Nervous system:  Alert & Oriented X3, no focal deficits  Psychiatric: Normal affect  Skin: No rashes or lesions    LABS:  01-07    140  |  104  |  12  ----------------------------<  79  3.6   |  28  |  0.83    Ca    8.6      07 Jan 2023 07:30  Phos  2.7     01-07  Mg     2.00     01-07    TPro  6.5  /  Alb  3.7  /  TBili  0.3  /  DBili  x   /  AST  13  /  ALT  13  /  AlkPhos  50  01-07    Creatinine Trend: 0.83<--, 0.77<--                        13.1   5.23  )-----------( 184      ( 07 Jan 2023 07:30 )             39.2

## 2023-01-08 NOTE — PROGRESS NOTE ADULT - ASSESSMENT
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 During procedure RV lead visually appeared to have the insulation fall apart, explaining all the noise seen.  Given that, the decision was made to close the pocket, a Tyrex pouch was left, and plan to transfer to [facility] for lead extraction on Monday with re-implantation.      He is doing well awaiting transfer  Cont telemetry monitoring   Cont cardiac meds as ordered  EP to follow    Eitan Garcia MD  Cardiology Fellow       Please check [email or web address]: [email or web address]" for cardiology service schedule and contact information.     
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 During procedure RV lead visually appeared to have the insulation fall apart, explaining all the noise seen.  Given that, the decision was made to close the pocket, a Tyrex pouch was left, and plan to transfer to [facility] for lead extraction on Monday with re-implantation.    HTn - cont coreg+nifedipine    HLD - cont statin    Hypothyroidism - cont synthroid    
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 During procedure RV lead visually appeared to have the insulation fall apart, explaining all the noise seen.  Given that, the decision was made to close the pocket, a Tyrex pouch was left, and plan to transfer to [facility] for lead extraction on Monday with re-implantation.      He is doing well awaiting transfer to [facility]   Needs COVID swab today   Cont telemetry monitoring   Cont cardiac meds as ordered  EP to follow    Eitan Garcia MD  Cardiology Fellow       Please check [email or web address]: [email or web address]" for cardiology service schedule and contact information.     
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 During procedure RV lead visually appeared to have the insulation fall apart, explaining all the noise seen.  Given that, the decision was made to close the pocket, a Tyrex pouch was left, and plan to transfer to [facility] for lead extraction on Monday with re-implantation.      - EP to follow to transfer     # HTn   - BP controlled   - cont coreg and nifedipine    # HLD   - cont statin    #Hypothyroidism   - cont synthroid    
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 During procedure RV lead visually appeared to have the insulation fall apart, explaining all the noise seen.  Given that, the decision was made to close the pocket, a Tyrex pouch was left, and plan to transfer to [facility] for lead extraction on Monday with re-implantation.    HTn - cont coreg+nifedipine    HLD - cont statin    Hypothyroidism - cont synthroid

## 2023-01-09 ENCOUNTER — TRANSCRIPTION ENCOUNTER (OUTPATIENT)
Age: 73
End: 2023-01-09

## 2023-01-09 ENCOUNTER — OUTPATIENT (OUTPATIENT)
Dept: INPATIENT UNIT | Facility: HOSPITAL | Age: 73
LOS: 1 days | Discharge: HOME | End: 2023-01-09
Payer: MEDICARE

## 2023-01-09 VITALS
OXYGEN SATURATION: 99 % | HEART RATE: 80 BPM | TEMPERATURE: 98 F | DIASTOLIC BLOOD PRESSURE: 72 MMHG | SYSTOLIC BLOOD PRESSURE: 125 MMHG | RESPIRATION RATE: 18 BRPM

## 2023-01-09 VITALS
TEMPERATURE: 98 F | OXYGEN SATURATION: 96 % | DIASTOLIC BLOOD PRESSURE: 88 MMHG | WEIGHT: 220.02 LBS | RESPIRATION RATE: 18 BRPM | HEIGHT: 71 IN | SYSTOLIC BLOOD PRESSURE: 119 MMHG | HEART RATE: 51 BPM

## 2023-01-09 VITALS
TEMPERATURE: 98 F | OXYGEN SATURATION: 99 % | SYSTOLIC BLOOD PRESSURE: 130 MMHG | RESPIRATION RATE: 17 BRPM | DIASTOLIC BLOOD PRESSURE: 79 MMHG | HEART RATE: 50 BPM

## 2023-01-09 DIAGNOSIS — Z95.0 PRESENCE OF CARDIAC PACEMAKER: Chronic | ICD-10-CM

## 2023-01-09 DIAGNOSIS — C90.00 MULTIPLE MYELOMA NOT HAVING ACHIEVED REMISSION: Chronic | ICD-10-CM

## 2023-01-09 DIAGNOSIS — Z98.89 OTHER SPECIFIED POSTPROCEDURAL STATES: Chronic | ICD-10-CM

## 2023-01-09 DIAGNOSIS — T82.599A OTHER MECHANICAL COMPLICATION OF UNSPECIFIED CARDIAC AND VASCULAR DEVICES AND IMPLANTS, INITIAL ENCOUNTER: ICD-10-CM

## 2023-01-09 LAB
ANION GAP SERPL CALC-SCNC: 13 MMOL/L — SIGNIFICANT CHANGE UP (ref 7–14)
BUN SERPL-MCNC: 13 MG/DL — SIGNIFICANT CHANGE UP (ref 7–23)
CALCIUM SERPL-MCNC: 8.8 MG/DL — SIGNIFICANT CHANGE UP (ref 8.4–10.5)
CHLORIDE SERPL-SCNC: 102 MMOL/L — SIGNIFICANT CHANGE UP (ref 98–107)
CO2 SERPL-SCNC: 19 MMOL/L — LOW (ref 22–31)
CREAT SERPL-MCNC: 0.7 MG/DL — SIGNIFICANT CHANGE UP (ref 0.5–1.3)
EGFR: 97 ML/MIN/1.73M2 — SIGNIFICANT CHANGE UP
GLUCOSE SERPL-MCNC: 81 MG/DL — SIGNIFICANT CHANGE UP (ref 70–99)
HCT VFR BLD CALC: 41.1 % — SIGNIFICANT CHANGE UP (ref 39–50)
HGB BLD-MCNC: 13.7 G/DL — SIGNIFICANT CHANGE UP (ref 13–17)
MAGNESIUM SERPL-MCNC: 2.1 MG/DL — SIGNIFICANT CHANGE UP (ref 1.6–2.6)
MCHC RBC-ENTMCNC: 32.4 PG — SIGNIFICANT CHANGE UP (ref 27–34)
MCHC RBC-ENTMCNC: 33.3 GM/DL — SIGNIFICANT CHANGE UP (ref 32–36)
MCV RBC AUTO: 97.2 FL — SIGNIFICANT CHANGE UP (ref 80–100)
NRBC # BLD: 0 /100 WBCS — SIGNIFICANT CHANGE UP (ref 0–0)
NRBC # FLD: 0 K/UL — SIGNIFICANT CHANGE UP (ref 0–0)
PHOSPHATE SERPL-MCNC: 2.4 MG/DL — LOW (ref 2.5–4.5)
PLATELET # BLD AUTO: 193 K/UL — SIGNIFICANT CHANGE UP (ref 150–400)
POTASSIUM SERPL-MCNC: 3.4 MMOL/L — LOW (ref 3.5–5.3)
POTASSIUM SERPL-SCNC: 3.4 MMOL/L — LOW (ref 3.5–5.3)
RBC # BLD: 4.23 M/UL — SIGNIFICANT CHANGE UP (ref 4.2–5.8)
RBC # FLD: 13.8 % — SIGNIFICANT CHANGE UP (ref 10.3–14.5)
SODIUM SERPL-SCNC: 134 MMOL/L — LOW (ref 135–145)
WBC # BLD: 6.16 K/UL — SIGNIFICANT CHANGE UP (ref 3.8–10.5)
WBC # FLD AUTO: 6.16 K/UL — SIGNIFICANT CHANGE UP (ref 3.8–10.5)

## 2023-01-09 PROCEDURE — 86901 BLOOD TYPING SEROLOGIC RH(D): CPT

## 2023-01-09 PROCEDURE — 71045 X-RAY EXAM CHEST 1 VIEW: CPT

## 2023-01-09 PROCEDURE — C1730: CPT

## 2023-01-09 PROCEDURE — 86923 COMPATIBILITY TEST ELECTRIC: CPT

## 2023-01-09 PROCEDURE — C1892: CPT

## 2023-01-09 PROCEDURE — C1894: CPT

## 2023-01-09 PROCEDURE — 86850 RBC ANTIBODY SCREEN: CPT

## 2023-01-09 PROCEDURE — C1773: CPT

## 2023-01-09 PROCEDURE — 71045 X-RAY EXAM CHEST 1 VIEW: CPT | Mod: 26

## 2023-01-09 PROCEDURE — 93005 ELECTROCARDIOGRAM TRACING: CPT

## 2023-01-09 PROCEDURE — C1769: CPT

## 2023-01-09 PROCEDURE — C1889: CPT

## 2023-01-09 PROCEDURE — C1898: CPT

## 2023-01-09 PROCEDURE — 33208 INSRT HEART PM ATRIAL & VENT: CPT | Mod: KX

## 2023-01-09 PROCEDURE — 33233 REMOVAL OF PM GENERATOR: CPT

## 2023-01-09 PROCEDURE — C1785: CPT

## 2023-01-09 PROCEDURE — 86900 BLOOD TYPING SEROLOGIC ABO: CPT

## 2023-01-09 PROCEDURE — 76000 FLUOROSCOPY <1 HR PHYS/QHP: CPT

## 2023-01-09 DEVICE — PACEMAKER ASSURITY MRI DUAL: Type: IMPLANTABLE DEVICE | Status: FUNCTIONAL

## 2023-01-09 DEVICE — SHEATH PRELUDE 7FX25CM: Type: IMPLANTABLE DEVICE | Status: FUNCTIONAL

## 2023-01-09 DEVICE — INTRODUCER SET MICROPUNCTURE ACCESS 21G X 7CM: Type: IMPLANTABLE DEVICE | Status: FUNCTIONAL

## 2023-01-09 DEVICE — IMPLANTABLE DEVICE: Type: IMPLANTABLE DEVICE | Status: FUNCTIONAL

## 2023-01-09 DEVICE — KIT BRIDGE ACESSORY: Type: IMPLANTABLE DEVICE | Status: FUNCTIONAL

## 2023-01-09 DEVICE — SHEATH INTRODUCER AVANTI 6FR X 11CM X 0.038" MINI WIRE (GREEN): Type: IMPLANTABLE DEVICE | Status: FUNCTIONAL

## 2023-01-09 DEVICE — KIT A-LINE 1LUM 20G X 12CM SAFE KIT: Type: IMPLANTABLE DEVICE | Status: FUNCTIONAL

## 2023-01-09 DEVICE — ENVELOPE TYRX ABSORBABLE ANTIBACTERIAL LG: Type: IMPLANTABLE DEVICE | Status: FUNCTIONAL

## 2023-01-09 DEVICE — CATH EP QUAD SUP JSN 5FRX120CM: Type: IMPLANTABLE DEVICE | Status: FUNCTIONAL

## 2023-01-09 DEVICE — GWIRE ANGL .035X80 STD: Type: IMPLANTABLE DEVICE | Status: FUNCTIONAL

## 2023-01-09 DEVICE — DEVICE LOCKING LOCKING LLD EZ CLEARS: Type: IMPLANTABLE DEVICE | Status: FUNCTIONAL

## 2023-01-09 RX ORDER — CHLORHEXIDINE GLUCONATE 213 G/1000ML
1 SOLUTION TOPICAL ONCE
Refills: 0 | Status: DISCONTINUED | OUTPATIENT
Start: 2023-01-09 | End: 2023-01-09

## 2023-01-09 RX ORDER — ACETAMINOPHEN 500 MG
2 TABLET ORAL
Qty: 0 | Refills: 0 | DISCHARGE
Start: 2023-01-09

## 2023-01-09 RX ORDER — LATANOPROST 0.05 MG/ML
1 SOLUTION/ DROPS OPHTHALMIC; TOPICAL
Qty: 0 | Refills: 0 | DISCHARGE

## 2023-01-09 RX ORDER — OXYCODONE HYDROCHLORIDE 5 MG/1
5 TABLET ORAL EVERY 6 HOURS
Refills: 0 | Status: DISCONTINUED | OUTPATIENT
Start: 2023-01-09 | End: 2023-01-09

## 2023-01-09 RX ORDER — POTASSIUM CHLORIDE 20 MEQ
10 PACKET (EA) ORAL
Refills: 0 | Status: DISCONTINUED | OUTPATIENT
Start: 2023-01-09 | End: 2023-01-09

## 2023-01-09 RX ORDER — ASPIRIN/CALCIUM CARB/MAGNESIUM 324 MG
1 TABLET ORAL
Qty: 0 | Refills: 0 | DISCHARGE

## 2023-01-09 RX ORDER — L.ACIDOPH/B.ANIMALIS/B.LONGUM 15B CELL
1 CAPSULE ORAL
Qty: 0 | Refills: 0 | DISCHARGE

## 2023-01-09 RX ORDER — PREGABALIN 225 MG/1
1 CAPSULE ORAL
Qty: 0 | Refills: 0 | DISCHARGE

## 2023-01-09 RX ORDER — BENZOCAINE AND MENTHOL 5; 1 G/100ML; G/100ML
1 LIQUID ORAL EVERY 4 HOURS
Refills: 0 | Status: DISCONTINUED | OUTPATIENT
Start: 2023-01-09 | End: 2023-01-09

## 2023-01-09 RX ORDER — NIFEDIPINE 30 MG
1 TABLET, EXTENDED RELEASE 24 HR ORAL
Qty: 0 | Refills: 0 | DISCHARGE
Start: 2023-01-09

## 2023-01-09 RX ORDER — POTASSIUM CHLORIDE 20 MEQ
40 PACKET (EA) ORAL ONCE
Refills: 0 | Status: DISCONTINUED | OUTPATIENT
Start: 2023-01-09 | End: 2023-01-09

## 2023-01-09 RX ORDER — CHOLECALCIFEROL (VITAMIN D3) 125 MCG
1 CAPSULE ORAL
Qty: 0 | Refills: 0 | DISCHARGE

## 2023-01-09 RX ORDER — HYDROMORPHONE HYDROCHLORIDE 2 MG/ML
0.5 INJECTION INTRAMUSCULAR; INTRAVENOUS; SUBCUTANEOUS
Refills: 0 | Status: DISCONTINUED | OUTPATIENT
Start: 2023-01-09 | End: 2023-01-09

## 2023-01-09 RX ORDER — THALIDOMIDE 200 MG/1
1 CAPSULE ORAL
Qty: 0 | Refills: 0 | DISCHARGE

## 2023-01-09 RX ORDER — FLUTICASONE PROPIONATE 50 MCG
1 SPRAY, SUSPENSION NASAL
Qty: 0 | Refills: 0 | DISCHARGE

## 2023-01-09 RX ORDER — ONDANSETRON 8 MG/1
4 TABLET, FILM COATED ORAL ONCE
Refills: 0 | Status: COMPLETED | OUTPATIENT
Start: 2023-01-09 | End: 2023-01-09

## 2023-01-09 RX ORDER — ASPIRIN/CALCIUM CARB/MAGNESIUM 324 MG
1 TABLET ORAL
Qty: 0 | Refills: 0 | DISCHARGE
Start: 2023-01-09

## 2023-01-09 RX ORDER — ACETAMINOPHEN 500 MG
650 TABLET ORAL EVERY 6 HOURS
Refills: 0 | Status: DISCONTINUED | OUTPATIENT
Start: 2023-01-09 | End: 2023-01-09

## 2023-01-09 RX ORDER — LEVOTHYROXINE SODIUM 125 MCG
1 TABLET ORAL
Qty: 0 | Refills: 0 | DISCHARGE
Start: 2023-01-09

## 2023-01-09 RX ORDER — LEVOTHYROXINE SODIUM 125 MCG
1 TABLET ORAL
Qty: 0 | Refills: 0 | DISCHARGE

## 2023-01-09 RX ORDER — CARVEDILOL PHOSPHATE 80 MG/1
1 CAPSULE, EXTENDED RELEASE ORAL
Qty: 0 | Refills: 0 | DISCHARGE
Start: 2023-01-09

## 2023-01-09 RX ORDER — OXYCODONE HYDROCHLORIDE 5 MG/1
10 TABLET ORAL EVERY 4 HOURS
Refills: 0 | Status: DISCONTINUED | OUTPATIENT
Start: 2023-01-09 | End: 2023-01-09

## 2023-01-09 RX ORDER — LATANOPROST 0.05 MG/ML
1 SOLUTION/ DROPS OPHTHALMIC; TOPICAL
Qty: 0 | Refills: 0 | DISCHARGE
Start: 2023-01-09

## 2023-01-09 RX ORDER — CARVEDILOL PHOSPHATE 80 MG/1
1 CAPSULE, EXTENDED RELEASE ORAL
Qty: 0 | Refills: 0 | DISCHARGE

## 2023-01-09 RX ORDER — NIFEDIPINE 30 MG
1 TABLET, EXTENDED RELEASE 24 HR ORAL
Qty: 0 | Refills: 0 | DISCHARGE

## 2023-01-09 RX ORDER — CEFAZOLIN SODIUM 1 G
2000 VIAL (EA) INJECTION ONCE
Refills: 0 | Status: COMPLETED | OUTPATIENT
Start: 2023-01-09 | End: 2023-01-09

## 2023-01-09 RX ORDER — FLUTICASONE PROPIONATE 50 MCG
1 SPRAY, SUSPENSION NASAL
Qty: 0 | Refills: 0 | DISCHARGE
Start: 2023-01-09

## 2023-01-09 RX ADMIN — ONDANSETRON 4 MILLIGRAM(S): 8 TABLET, FILM COATED ORAL at 15:54

## 2023-01-09 RX ADMIN — SODIUM CHLORIDE 3 MILLILITER(S): 9 INJECTION INTRAMUSCULAR; INTRAVENOUS; SUBCUTANEOUS at 06:11

## 2023-01-09 RX ADMIN — Medication 650 MILLIGRAM(S): at 20:16

## 2023-01-09 RX ADMIN — Medication 1 SPRAY(S): at 05:23

## 2023-01-09 RX ADMIN — BENZOCAINE AND MENTHOL 1 LOZENGE: 5; 1 LIQUID ORAL at 17:24

## 2023-01-09 RX ADMIN — CARVEDILOL PHOSPHATE 25 MILLIGRAM(S): 80 CAPSULE, EXTENDED RELEASE ORAL at 05:22

## 2023-01-09 RX ADMIN — Medication 100 MILLIEQUIVALENT(S): at 10:54

## 2023-01-09 RX ADMIN — Medication 90 MILLIGRAM(S): at 05:23

## 2023-01-09 RX ADMIN — Medication 650 MILLIGRAM(S): at 20:46

## 2023-01-09 RX ADMIN — Medication 25 MICROGRAM(S): at 05:23

## 2023-01-09 NOTE — PRE-ANESTHESIA EVALUATION ADULT - NSANTHPMHFT_GEN_ALL_CORE
72M PMH HTN, HLD, hypothyroid, sinus node dysfunction s/p PPM with RV lead noise 72M PMH HTN, HLD, hypothyroid, GERD, multuple myeloma,  sinus node dysfunction s/p PPM with RV lead noise\  METS>4. denies CP/SOB; N/V, dysphagia

## 2023-01-09 NOTE — DISCHARGE NOTE PROVIDER - HOSPITAL COURSE
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 During procedure RV lead visually appeared to have the insulation fall apart, explaining all the noise seen.  Given that, the decision was made to close the pocket, a Tyrex pouch was left, and plan to transfer to [facility] for lead extraction on Monday with re-implantation.    HTn - cont coreg+nifedipine    HLD - cont statin    Hypothyroidism - cont synthroid    EMS arrived for pick of patient to transfer to [facility]

## 2023-01-09 NOTE — DISCHARGE NOTE PROVIDER - INSTRUCTIONS
NPO for procedure, follow up with care providers at [facility] for further recommendations after your procedure is performed.

## 2023-01-09 NOTE — DISCHARGE NOTE NURSING/CASE MANAGEMENT/SOCIAL WORK - NSDCPEFALRISK_GEN_ALL_CORE
For information on Fall & Injury Prevention, visit: [email or web address] OR  [email or web address] OR  https://www.cdc.gov/steadi/patient.html

## 2023-01-09 NOTE — DISCHARGE NOTE PROVIDER - NSDCFUSCHEDAPPT_GEN_ALL_CORE_FT
[facility] [phone]  Scheduled Appointment: 01/18/2023    Melinda Correa  [facility] PreAdmits  Scheduled Appointment: 01/19/2023    [facility] [phone]  Scheduled Appointment: 03/01/2023

## 2023-01-09 NOTE — DISCHARGE NOTE PROVIDER - NSDCCPCAREPLAN_GEN_ALL_CORE_FT
PRINCIPAL DISCHARGE DIAGNOSIS  Diagnosis: H/O sick sinus syndrome  Assessment and Plan of Treatment: transfer to [facility] for lead extraction on Monday with re-implantation [facility] Dr. Correa

## 2023-01-09 NOTE — H&P CARDIOLOGY - HISTORY OF PRESENT ILLNESS
71 y/o Male with PMhx of Sinus node dysfunction s/p PPM placement in 2011 w hx of RV lead noise presented  to [facility] on 1/6 for generator change as his device is at [facility]. During procedure RV lead visually appeared to have the insulation fall apart, explaining all the noise seen.  Given that, the decision was made to close the pocket, a Tyrex pouch was left and patient was admitted to telemetry.  Patient now transfered to [facility] for lead extraction with re-implantation.   73 y/o Male with PMhx of HTN, Hypothyroidism, Multiple Myeloma and Sinus node dysfunction s/p PPM placement in 2011 w hx of RV lead noise presented  to [facility] on 1/6 for generator change as his device is at [facility]. During procedure RV lead visually appeared to have the insulation fall apart, explaining all the noise seen.  Given that, the decision was made to close the pocket, a Tyrex pouch was left and patient was admitted to telemetry.  Patient now transfered to [facility] for lead extraction with re-implantation.

## 2023-01-09 NOTE — PATIENT PROFILE ADULT - FALL HARM RISK - HARM RISK INTERVENTIONS

## 2023-01-09 NOTE — DISCHARGE NOTE PROVIDER - CARE PROVIDER_API CALL
Melinda Correa)  Cardiovascular Disease; Internal Medicine  [phone] [place] [address]  Phone: [phone]  Fax: [phone]  Follow Up Time:

## 2023-01-09 NOTE — ASU DISCHARGE PLAN (ADULT/PEDIATRIC) - CARE PROVIDER_API CALL
Melinda Correa)  Cardiovascular Disease; Internal Medicine  [phone] [place] [address]  Phone: [phone]  Fax: [phone]  Scheduled Appointment: 01/18/2023 11:00 AM

## 2023-01-09 NOTE — H&P CARDIOLOGY - NS_MD_PANP_GEN_ALL_CORE
Render Risk Assessment In Note?: no
Detail Level: Simple
Additional Notes: Patient consent was obtained to proceed with the visit and recommended plan of care after discussion of all risks and benefits, including the risks of COVID-19 exposure
Additional Notes: DS recommended peels with aestheticians.
Attending and PA/NP shared services statement (NON-critical care):

## 2023-01-09 NOTE — DISCHARGE NOTE NURSING/CASE MANAGEMENT/SOCIAL WORK - PATIENT PORTAL LINK FT
You can access the FollowMyHealth Patient Portal offered by [facility] by registering at the following website: [email or web address]. By joining [facility]’s FollowMyHealth portal, you will also be able to view your health information using other applications (apps) compatible with our system.

## 2023-01-09 NOTE — H&P CARDIOLOGY - NS ATTEND AMEND GEN_ALL_CORE FT
Patient seen and examined in [facility] this morning. Patient anxious and frustrated about his stay over the weekend. He is frustrated this morning because he did not realize how much more involved the extraction is compared to a placement or generator change. We had discussed this in the office as well as on Friday after the attempted generator change. I re-addressed the entire procedure with him today. Explained the risks and benefits, including the possibility of open heart surgery and death. He stated he now understood and is willing to proceed. Consent was signed.     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 He will need extraction with re-implantation of a new system.

## 2023-01-09 NOTE — DISCHARGE NOTE PROVIDER - NSDCMRMEDTOKEN_GEN_ALL_CORE_FT
aspirin 81 mg oral delayed release tablet: 1 tab(s) orally once a day  carvedilol 25 mg oral tablet: 1 tab(s) orally every 12 hours  fluticasone 50 mcg/inh nasal spray: 1 spray(s) nasal 2 times a day  latanoprost 0.005% ophthalmic solution: 1 drop(s) to each affected eye once a day (at bedtime)  levothyroxine 25 mcg (0.025 mg) oral tablet: 1 tab(s) orally once a day  NIFEdipine 90 mg oral tablet, extended release: 1 tab(s) orally once a day

## 2023-01-10 PROBLEM — Z86.69 PERSONAL HISTORY OF OTHER DISEASES OF THE NERVOUS SYSTEM AND SENSE ORGANS: Chronic | Status: ACTIVE | Noted: 2022-12-22

## 2023-01-10 PROBLEM — N39.0 URINARY TRACT INFECTION, SITE NOT SPECIFIED: Chronic | Status: ACTIVE | Noted: 2022-12-22

## 2023-01-12 ENCOUNTER — NON-APPOINTMENT (OUTPATIENT)
Age: 73
End: 2023-01-12

## 2023-01-12 ENCOUNTER — APPOINTMENT (OUTPATIENT)
Dept: ELECTROPHYSIOLOGY | Facility: CLINIC | Age: 73
End: 2023-01-12
Payer: MEDICARE

## 2023-01-12 VITALS
SYSTOLIC BLOOD PRESSURE: 132 MMHG | HEIGHT: 71 IN | WEIGHT: 220 LBS | OXYGEN SATURATION: 98 % | BODY MASS INDEX: 30.8 KG/M2 | HEART RATE: 60 BPM | TEMPERATURE: 97.7 F | DIASTOLIC BLOOD PRESSURE: 67 MMHG

## 2023-01-12 DIAGNOSIS — R07.89 OTHER CHEST PAIN: ICD-10-CM

## 2023-01-12 DIAGNOSIS — Z95.0 PRESENCE OF CARDIAC PACEMAKER: ICD-10-CM

## 2023-01-12 PROCEDURE — 93000 ELECTROCARDIOGRAM COMPLETE: CPT

## 2023-01-12 PROCEDURE — 99214 OFFICE O/P EST MOD 30 MIN: CPT | Mod: 24

## 2023-01-12 RX ORDER — THALIDOMIDE 200 MG/1
1 CAPSULE ORAL
Qty: 0 | Refills: 0 | DISCHARGE

## 2023-01-17 PROBLEM — R07.89 CHEST DISCOMFORT: Status: ACTIVE | Noted: 2023-01-17

## 2023-01-17 PROBLEM — Z95.0 CARDIAC PACEMAKER: Status: ACTIVE | Noted: 2021-09-22

## 2023-01-17 NOTE — CARDIOLOGY SUMMARY
[de-identified] : 9/22/21 - A-V sequential pacing at 50 bpm\par 11/30/22 - A paced V sensed at 50 bpm\par 1/12/23 - A paced V sensed at 60 bpm

## 2023-01-17 NOTE — HISTORY OF PRESENT ILLNESS
[FreeTextEntry1] : Referring Physician: Brennan Hanson MD\par  \par Dear Dr. Hanson:\par  \par Mr. Bhatt was seen in the [facility] today. For our records, please allow me to summarize the history and my findings.\par  \par This pleasant 72 year old man has a cardiovascular history significant for sinus node dysfunction. His medical history is significant for HTN and multiple myeloma. A Holter monitor performed on 6/3/21 showed a HR of <50% more than 70% of the time, with 23 pauses exceeding 2 seconds. His Coreg was subsequently stopped, but a repeat Holter on 6/21/11 off Coreg showed 10 pauses. Given his fatigue, bradycardia, and pauses, he underwent placement of a dual chamber PPM by Dr Gallegos. He was noted in 2011 to have RV lead noise, with frequent mode switches to DOO due to noise revision. Given that he was not V pacer dependant, Dr Gallegos turned off noise revision. The patient returns for follow up in our clinic previously, as he changed cardiologist and needs his device managed now. His interrogation today shows multiple episodes of RV lead noise which are unchanged. He has both an atrial and ventricular underlying. He is A-V pacing today, though overall his V pacing burden has been increasing. He has not has any dizziness or syncope episodes. \par \par His device reached [facility], and we discussed again generator change vs extraction. Initially we decided on proceeding with generator change, however when the pocket was opened, his RV lead insulation had a significant breach, explaining the noise. The pocket was closed, and he was transferred to [facility] and underwent extraction with re-implantation. He states he has been feeling chest tightness the last couple of days, improves when he burps. He felt one episode of dizziness yesterday walking up the stairs, but has walked up since without any issues. \par  \par Mr. Bhatt denies any recent history of chest pain, shortness of breath, palpitations, or syncope.

## 2023-01-17 NOTE — DISCUSSION/SUMMARY
[FreeTextEntry1] : In summary, this is a 72 year old man with sinus node dysfunction s/p dual chamber PPM in 2011 with increasing RV pacing burden. He is s/p extraction with re-implantation. His device in functioning well. His symptoms appear to be GI related. I performed a limited echo today that revealed no pericardial effusion. I reprogrammed his device to DDDR , which also may help his symptoms. He stated he felt much better after being seen. \par \par Mr. Bhatt appeared to understand the whole discussion and verbalized that all of his questions were answered to his satisfaction.\par  \par Thank you for allowing me to be involved in the care of this pleasant man. Please feel free to contact me with any questions. [EKG obtained to assist in diagnosis and management of assessed problem(s)] : EKG obtained to assist in diagnosis and management of assessed problem(s)

## 2023-01-17 NOTE — REVIEW OF SYSTEMS
Anesthesia aware of patient wanting an epidural. Give only 500cc bolus due to HTN [Negative] : Heme/Lymph [FreeTextEntry5] : See HPI

## 2023-01-17 NOTE — PHYSICAL EXAM
[Well Developed] : well developed [Well Nourished] : well nourished [No Acute Distress] : no acute distress [Normal Conjunctiva] : normal conjunctiva [Normal Venous Pressure] : normal venous pressure [No Carotid Bruit] : no carotid bruit [Normal S1, S2] : normal S1, S2 [No Murmur] : no murmur [No Rub] : no rub [No Gallop] : no gallop [Clear Lung Fields] : clear lung fields [Good Air Entry] : good air entry [No Respiratory Distress] : no respiratory distress  [Soft] : abdomen soft [Non Tender] : non-tender [No Masses/organomegaly] : no masses/organomegaly [Normal Bowel Sounds] : normal bowel sounds [Normal Gait] : normal gait [No Edema] : no edema [No Cyanosis] : no cyanosis [No Clubbing] : no clubbing [No Varicosities] : no varicosities [No Rash] : no rash [No Skin Lesions] : no skin lesions [Moves all extremities] : moves all extremities [No Focal Deficits] : no focal deficits [Normal Speech] : normal speech [Alert and Oriented] : alert and oriented [Normal memory] : normal memory [de-identified] : Right chest infra-clavicular wound well healed, no erythema or TTP or swelling.

## 2023-01-18 ENCOUNTER — APPOINTMENT (OUTPATIENT)
Dept: ELECTROPHYSIOLOGY | Facility: CLINIC | Age: 73
End: 2023-01-18
Payer: MEDICARE

## 2023-01-18 PROCEDURE — 99024 POSTOP FOLLOW-UP VISIT: CPT

## 2023-02-01 ENCOUNTER — APPOINTMENT (OUTPATIENT)
Dept: ELECTROPHYSIOLOGY | Facility: CLINIC | Age: 73
End: 2023-02-01
Payer: MEDICARE

## 2023-02-01 PROCEDURE — 99024 POSTOP FOLLOW-UP VISIT: CPT

## 2023-05-03 ENCOUNTER — NON-APPOINTMENT (OUTPATIENT)
Age: 73
End: 2023-05-03

## 2023-05-03 ENCOUNTER — APPOINTMENT (OUTPATIENT)
Dept: ELECTROPHYSIOLOGY | Facility: CLINIC | Age: 73
End: 2023-05-03
Payer: MEDICARE

## 2023-05-03 PROCEDURE — 93294 REM INTERROG EVL PM/LDLS PM: CPT

## 2023-05-03 PROCEDURE — 93296 REM INTERROG EVL PM/IDS: CPT

## 2023-08-02 ENCOUNTER — NON-APPOINTMENT (OUTPATIENT)
Age: 73
End: 2023-08-02

## 2023-08-02 ENCOUNTER — APPOINTMENT (OUTPATIENT)
Dept: ELECTROPHYSIOLOGY | Facility: CLINIC | Age: 73
End: 2023-08-02
Payer: MEDICARE

## 2023-08-02 VITALS — RESPIRATION RATE: 14 BRPM | SYSTOLIC BLOOD PRESSURE: 134 MMHG | DIASTOLIC BLOOD PRESSURE: 87 MMHG | HEART RATE: 70 BPM

## 2023-08-02 DIAGNOSIS — I49.5 SICK SINUS SYNDROME: ICD-10-CM

## 2023-08-02 PROCEDURE — 93280 PM DEVICE PROGR EVAL DUAL: CPT

## 2023-08-02 RX ORDER — CEPHALEXIN 500 MG/1
500 TABLET ORAL 3 TIMES DAILY
Qty: 9 | Refills: 0 | Status: DISCONTINUED | COMMUNITY
Start: 2023-01-18 | End: 2023-08-02

## 2023-08-21 PROBLEM — I49.5 SINUS NODE DYSFUNCTION: Status: ACTIVE | Noted: 2021-09-22

## 2023-11-01 ENCOUNTER — APPOINTMENT (OUTPATIENT)
Dept: ELECTROPHYSIOLOGY | Facility: CLINIC | Age: 73
End: 2023-11-01
Payer: MEDICARE

## 2023-11-01 ENCOUNTER — NON-APPOINTMENT (OUTPATIENT)
Age: 73
End: 2023-11-01

## 2023-11-01 PROCEDURE — 93294 REM INTERROG EVL PM/LDLS PM: CPT

## 2023-11-01 PROCEDURE — 93296 REM INTERROG EVL PM/IDS: CPT

## 2024-01-31 ENCOUNTER — APPOINTMENT (OUTPATIENT)
Dept: ELECTROPHYSIOLOGY | Facility: CLINIC | Age: 74
End: 2024-01-31
Payer: MEDICARE

## 2024-01-31 ENCOUNTER — NON-APPOINTMENT (OUTPATIENT)
Age: 74
End: 2024-01-31

## 2024-01-31 PROCEDURE — 93294 REM INTERROG EVL PM/LDLS PM: CPT

## 2024-01-31 PROCEDURE — 93296 REM INTERROG EVL PM/IDS: CPT

## 2024-05-01 ENCOUNTER — NON-APPOINTMENT (OUTPATIENT)
Age: 74
End: 2024-05-01

## 2024-05-01 ENCOUNTER — APPOINTMENT (OUTPATIENT)
Dept: ELECTROPHYSIOLOGY | Facility: CLINIC | Age: 74
End: 2024-05-01
Payer: MEDICARE

## 2024-05-01 PROCEDURE — 93294 REM INTERROG EVL PM/LDLS PM: CPT

## 2024-05-01 PROCEDURE — 93296 REM INTERROG EVL PM/IDS: CPT

## 2024-08-14 ENCOUNTER — NON-APPOINTMENT (OUTPATIENT)
Age: 74
End: 2024-08-14

## 2024-08-14 ENCOUNTER — APPOINTMENT (OUTPATIENT)
Dept: ELECTROPHYSIOLOGY | Facility: CLINIC | Age: 74
End: 2024-08-14
Payer: MEDICARE

## 2024-08-14 VITALS — DIASTOLIC BLOOD PRESSURE: 95 MMHG | SYSTOLIC BLOOD PRESSURE: 155 MMHG | HEART RATE: 75 BPM

## 2024-08-14 DIAGNOSIS — I45.9 CONDUCTION DISORDER, UNSPECIFIED: ICD-10-CM

## 2024-08-14 DIAGNOSIS — I49.5 SICK SINUS SYNDROME: ICD-10-CM

## 2024-08-14 PROCEDURE — 93280 PM DEVICE PROGR EVAL DUAL: CPT

## 2024-10-31 ENCOUNTER — APPOINTMENT (OUTPATIENT)
Dept: ELECTROPHYSIOLOGY | Facility: CLINIC | Age: 74
End: 2024-10-31

## 2024-11-14 ENCOUNTER — APPOINTMENT (OUTPATIENT)
Dept: ELECTROPHYSIOLOGY | Facility: CLINIC | Age: 74
End: 2024-11-14
Payer: MEDICARE

## 2024-11-14 ENCOUNTER — NON-APPOINTMENT (OUTPATIENT)
Age: 74
End: 2024-11-14

## 2024-11-14 PROCEDURE — 93294 REM INTERROG EVL PM/LDLS PM: CPT

## 2024-11-14 PROCEDURE — 93296 REM INTERROG EVL PM/IDS: CPT

## 2024-12-17 NOTE — PATIENT PROFILE ADULT - FALL HARM RISK - ATTEMPT OOB
[de-identified] : Date of Surgery: 3/29/24  Neurologic: normal mood and affect, orientated and able to communicate Constitutional: well developed and well nourished  Left Knee: Full ROM Ligamental stable Nontender no effusion No

## 2025-02-14 ENCOUNTER — APPOINTMENT (OUTPATIENT)
Dept: ELECTROPHYSIOLOGY | Facility: CLINIC | Age: 75
End: 2025-02-14

## 2025-02-14 PROCEDURE — 93296 REM INTERROG EVL PM/IDS: CPT

## 2025-02-14 PROCEDURE — 93294 REM INTERROG EVL PM/LDLS PM: CPT

## 2025-04-02 ENCOUNTER — APPOINTMENT (OUTPATIENT)
Dept: CT IMAGING | Facility: IMAGING CENTER | Age: 75
End: 2025-04-02
Payer: MEDICARE

## 2025-04-02 ENCOUNTER — OUTPATIENT (OUTPATIENT)
Dept: OUTPATIENT SERVICES | Facility: HOSPITAL | Age: 75
LOS: 1 days | End: 2025-04-02
Payer: MEDICARE

## 2025-04-02 DIAGNOSIS — C90.00 MULTIPLE MYELOMA NOT HAVING ACHIEVED REMISSION: ICD-10-CM

## 2025-04-02 DIAGNOSIS — M54.2 CERVICALGIA: ICD-10-CM

## 2025-04-02 DIAGNOSIS — C90.00 MULTIPLE MYELOMA NOT HAVING ACHIEVED REMISSION: Chronic | ICD-10-CM

## 2025-04-02 DIAGNOSIS — Z98.89 OTHER SPECIFIED POSTPROCEDURAL STATES: Chronic | ICD-10-CM

## 2025-04-02 DIAGNOSIS — R97.20 ELEVATED PROSTATE SPECIFIC ANTIGEN [PSA]: ICD-10-CM

## 2025-04-02 DIAGNOSIS — Z95.0 PRESENCE OF CARDIAC PACEMAKER: Chronic | ICD-10-CM

## 2025-04-02 PROCEDURE — 72126 CT NECK SPINE W/DYE: CPT

## 2025-04-02 PROCEDURE — 72126 CT NECK SPINE W/DYE: CPT | Mod: 26

## 2025-04-29 NOTE — H&P PST ADULT - PROBLEM SELECTOR PROBLEM 3
Detail Level: Detailed
Quality 226: Preventive Care And Screening: Tobacco Use: Screening And Cessation Intervention: Patient screened for tobacco use and is an ex/non-smoker
Pacemaker

## 2025-05-16 ENCOUNTER — APPOINTMENT (OUTPATIENT)
Dept: ELECTROPHYSIOLOGY | Facility: CLINIC | Age: 75
End: 2025-05-16
Payer: MEDICARE

## 2025-05-16 ENCOUNTER — NON-APPOINTMENT (OUTPATIENT)
Age: 75
End: 2025-05-16

## 2025-05-16 PROCEDURE — 93296 REM INTERROG EVL PM/IDS: CPT

## 2025-05-16 PROCEDURE — 93294 REM INTERROG EVL PM/LDLS PM: CPT

## 2025-06-16 ENCOUNTER — APPOINTMENT (OUTPATIENT)
Dept: CT IMAGING | Facility: HOSPITAL | Age: 75
End: 2025-06-16

## 2025-08-13 ENCOUNTER — APPOINTMENT (OUTPATIENT)
Dept: ELECTROPHYSIOLOGY | Facility: CLINIC | Age: 75
End: 2025-08-13

## 2025-08-19 ENCOUNTER — APPOINTMENT (OUTPATIENT)
Dept: ELECTROPHYSIOLOGY | Facility: CLINIC | Age: 75
End: 2025-08-19

## 2025-09-03 ENCOUNTER — APPOINTMENT (OUTPATIENT)
Dept: ELECTROPHYSIOLOGY | Facility: CLINIC | Age: 75
End: 2025-09-03
Payer: MEDICARE

## 2025-09-03 ENCOUNTER — NON-APPOINTMENT (OUTPATIENT)
Age: 75
End: 2025-09-03

## 2025-09-03 VITALS — HEART RATE: 50 BPM | DIASTOLIC BLOOD PRESSURE: 84 MMHG | SYSTOLIC BLOOD PRESSURE: 143 MMHG

## 2025-09-03 DIAGNOSIS — I45.9 CONDUCTION DISORDER, UNSPECIFIED: ICD-10-CM

## 2025-09-03 DIAGNOSIS — I49.5 SICK SINUS SYNDROME: ICD-10-CM

## 2025-09-03 PROCEDURE — 93280 PM DEVICE PROGR EVAL DUAL: CPT

## (undated) DEVICE — SYR LUER LOK 10CC

## (undated) DEVICE — SUT DOUBLE 6 WIRE STERNAL

## (undated) DEVICE — LAP PAD 18 X 18"

## (undated) DEVICE — SUT STAINLESS STEEL 5 18" SCC

## (undated) DEVICE — BLADE SCALPEL SAFETYLOCK #11

## (undated) DEVICE — STEALTH CLAMP INSERT FIBRA/FIBRA 60MM

## (undated) DEVICE — SPECIMEN CONTAINER 100ML

## (undated) DEVICE — SUT POLYSORB 2-0 30" GS-21 UNDYED

## (undated) DEVICE — NDL HYPO REGULAR BEVEL 22G X 1.5" (TURQUOISE)

## (undated) DEVICE — SUT SOFSILK 0 30" V-20

## (undated) DEVICE — DRAPE IOBAN 23" X 23"

## (undated) DEVICE — DRAPE 1/2 SHEET 40X57"

## (undated) DEVICE — SUT POLYSORB 0 36" GS-25 UNDYED

## (undated) DEVICE — MEDICATION LABELS W MARKER

## (undated) DEVICE — SUT PLEDGET PRE PUNCH 4.8 X 9.5 X 1.5 MM

## (undated) DEVICE — SOL IRR POUR NS 0.9% 500ML

## (undated) DEVICE — POSITIONER FOAM EGG CRATE ULNAR 2PCS (PINK)

## (undated) DEVICE — VISITEC 4X4

## (undated) DEVICE — DRAPE 3/4 SHEET W REINFORCEMENT 56X77"

## (undated) DEVICE — WARMING BLANKET DUO-THERM HYPER/HYPOTHERM ADULT

## (undated) DEVICE — WARMING BLANKET FULL UNDERBODY

## (undated) DEVICE — VENODYNE/SCD SLEEVE CALF LARGE

## (undated) DEVICE — GLV 8 PROTEXIS (WHITE)

## (undated) DEVICE — GLV 7 PROTEXIS (WHITE)

## (undated) DEVICE — DEFIBRILLATOR PAD PRE-CONNECT ADULT/CHILD

## (undated) DEVICE — PACING CABLE TEMP MEDTRONIC WITH PAC-LOC

## (undated) DEVICE — DRAPE TOWEL BLUE 17" X 24"

## (undated) DEVICE — BLADE SCALPEL SAFETYLOCK #10

## (undated) DEVICE — GLV 8.5 PROTEXIS (WHITE)

## (undated) DEVICE — PACK UNIVERSAL CARDIAC

## (undated) DEVICE — PACING CABLE BI-V TEMP ALLIGATOR CLIP 8FR

## (undated) DEVICE — DRSG DERMABOND PRINEO 60CM

## (undated) DEVICE — DRAPE C ARM UNIVERSAL

## (undated) DEVICE — NDL HYPO SAFE 25G X 5/8" (ORANGE)

## (undated) DEVICE — PREP CHLORAPREP HI-LITE ORANGE 26ML

## (undated) DEVICE — TUBING CONTRAST INJECTION HIGH PRESSURE 1200PSI 72"

## (undated) DEVICE — DRAPE MAYO STAND 30"

## (undated) DEVICE — SUT BLUNT SZ 5

## (undated) DEVICE — CABLE SUPREME QUADRIPOLAR (BLACK)

## (undated) DEVICE — SAW BLADE MICROAIRE STERNUM 1X34X9.4MM

## (undated) DEVICE — SUT SOFSILK 2-0 18" TIES

## (undated) DEVICE — CABLE EXT DISP SRG MDL

## (undated) DEVICE — BLADE SCALPEL SAFETYLOCK #15

## (undated) DEVICE — FOLEY TRAY 16FR 5CC LF LUBRISIL ADVANCE TEMP CLOSED

## (undated) DEVICE — STEALTH CLAMP INSERT FIBRA/FIBRA 90MM

## (undated) DEVICE — GLV 6.5 PROTEXIS (WHITE)

## (undated) DEVICE — SUCTION YANKAUER NO CONTROL VENT

## (undated) DEVICE — SOL NORMOSOL-R PH7.4 1000ML

## (undated) DEVICE — SUT ETHIBOND 0 44" EN3

## (undated) DEVICE — STAPLER SKIN VISI-STAT 35 WIDE

## (undated) DEVICE — GLV 7.5 PROTEXIS (WHITE)

## (undated) DEVICE — DRSG OPSITE 13.75 X 4"

## (undated) DEVICE — KIT TORQUE WRENCH

## (undated) DEVICE — SUT SOFSILK 0 18" TIES

## (undated) DEVICE — SOL IRR POUR H2O 250ML

## (undated) DEVICE — SUT SURGICAL STEEL 6 30" BP-1

## (undated) DEVICE — SYR ASEPTO

## (undated) DEVICE — DRAPE INSTRUMENT POUCH 6.75" X 11"

## (undated) DEVICE — SUT BIOSYN 4-0 18" P-12

## (undated) DEVICE — TUBING BRAT 2 SUCTION ASSEMBLY TWIST LOCK